# Patient Record
Sex: MALE | Employment: OTHER | URBAN - METROPOLITAN AREA
[De-identification: names, ages, dates, MRNs, and addresses within clinical notes are randomized per-mention and may not be internally consistent; named-entity substitution may affect disease eponyms.]

---

## 2022-06-17 ENCOUNTER — OFFICE VISIT (OUTPATIENT)
Dept: VASCULAR SURGERY | Age: 72
End: 2022-06-17
Payer: COMMERCIAL

## 2022-06-17 VITALS — SYSTOLIC BLOOD PRESSURE: 110 MMHG | DIASTOLIC BLOOD PRESSURE: 70 MMHG | WEIGHT: 192 LBS

## 2022-06-17 DIAGNOSIS — N18.5 RENAL FAILURE, CHRONIC, STAGE 5 (HCC): ICD-10-CM

## 2022-06-17 DIAGNOSIS — Z78.9 LACK OF INTRAVENOUS ACCESS: ICD-10-CM

## 2022-06-17 PROCEDURE — G8421 BMI NOT CALCULATED: HCPCS | Performed by: SURGERY

## 2022-06-17 PROCEDURE — 99204 OFFICE O/P NEW MOD 45 MIN: CPT | Performed by: SURGERY

## 2022-06-17 PROCEDURE — 1036F TOBACCO NON-USER: CPT | Performed by: SURGERY

## 2022-06-17 PROCEDURE — G8427 DOCREV CUR MEDS BY ELIG CLIN: HCPCS | Performed by: SURGERY

## 2022-06-17 PROCEDURE — 3017F COLORECTAL CA SCREEN DOC REV: CPT | Performed by: SURGERY

## 2022-06-17 PROCEDURE — 1123F ACP DISCUSS/DSCN MKR DOCD: CPT | Performed by: SURGERY

## 2022-06-17 RX ORDER — CALCIUM ACETATE 667 MG/1
667 CAPSULE ORAL
COMMUNITY
Start: 2022-03-28

## 2022-06-17 RX ORDER — CLOBETASOL PROPIONATE 0.5 MG/G
OINTMENT TOPICAL PRN
COMMUNITY
Start: 2022-05-02

## 2022-06-17 RX ORDER — ERGOCALCIFEROL 1.25 MG/1
50000 CAPSULE ORAL WEEKLY
COMMUNITY
Start: 2022-06-13

## 2022-06-17 RX ORDER — GABAPENTIN 100 MG/1
100 CAPSULE ORAL
COMMUNITY
Start: 2022-05-02 | End: 2022-07-25 | Stop reason: ALTCHOICE

## 2022-06-17 RX ORDER — CINACALCET 30 MG/1
30 TABLET, FILM COATED ORAL
COMMUNITY
Start: 2022-06-09

## 2022-06-17 RX ORDER — PANTOPRAZOLE SODIUM 40 MG/1
1 TABLET, DELAYED RELEASE ORAL DAILY
COMMUNITY
End: 2022-07-25 | Stop reason: ALTCHOICE

## 2022-06-17 RX ORDER — SEVELAMER CARBONATE 800 MG/1
800 TABLET, FILM COATED ORAL
COMMUNITY
Start: 2022-05-12

## 2022-06-17 RX ORDER — AMMONIUM LACTATE 12 G/100G
LOTION TOPICAL 4 TIMES DAILY PRN
COMMUNITY
Start: 2022-03-01

## 2022-06-17 RX ORDER — TAMSULOSIN HYDROCHLORIDE 0.4 MG/1
0.4 CAPSULE ORAL NIGHTLY
COMMUNITY
Start: 2022-03-28

## 2022-06-17 RX ORDER — HYDROXYZINE HYDROCHLORIDE 25 MG/1
25 TABLET, FILM COATED ORAL
COMMUNITY
Start: 2022-05-04 | End: 2022-07-25 | Stop reason: ALTCHOICE

## 2022-06-17 RX ORDER — ERGOCALCIFEROL (VITAMIN D2) 1250 MCG
1250 CAPSULE ORAL WEEKLY
COMMUNITY
Start: 2022-03-28 | End: 2022-07-25 | Stop reason: ALTCHOICE

## 2022-06-17 RX ORDER — SODIUM ZIRCONIUM CYCLOSILICATE 10 G/10G
10 POWDER, FOR SUSPENSION ORAL SEE ADMIN INSTRUCTIONS
COMMUNITY
Start: 2022-06-13

## 2022-06-17 RX ORDER — FUROSEMIDE 40 MG/1
40 TABLET ORAL DAILY
COMMUNITY
Start: 2022-03-28

## 2022-06-17 RX ORDER — AMLODIPINE BESYLATE 5 MG/1
1 TABLET ORAL DAILY
COMMUNITY
Start: 2022-03-28 | End: 2022-07-25 | Stop reason: ALTCHOICE

## 2022-06-17 ASSESSMENT — ENCOUNTER SYMPTOMS
ALLERGIC/IMMUNOLOGIC NEGATIVE: 1
GASTROINTESTINAL NEGATIVE: 1
RESPIRATORY NEGATIVE: 1

## 2022-06-17 NOTE — PATIENT INSTRUCTIONS
At this time the plan is for a left arm fistula, however what type will depend upon your vein mapping results. This is outpatient surgery. You will need a ride to and from the hospital and also a pre op history and physical from your primary care doctor within 30 days of surgery. Closer to the date of your surgery you will be called with an arrival time and a surgery time, as well as to go over medications and instructions. Once you are home from the hospital call to make a two week post-op follow up. Also after surgery, keep your arm and hand wrapped in Ace Wraps to keep swelling down and to help with healing. Surgery will be scheduled for August 3, 2022.

## 2022-06-17 NOTE — PROGRESS NOTES
Daily Progress Note   Wang Spain MD      6/17/2022    Chief Complaint   Patient presents with    New Patient     [facility] by [facility] in arm. Pt says he had mapping done in April, dialysis is accessing from tunnel catheter currently but he says the last 2 times they had some difficulty. HISTORY OF PRESENT ILLNESS:                The patient is a 73 y.o. male who presents with a referral from Dr. Manuel Samuel at [address] [facility] [facility] to discuss placement of dialysis access. Mr. Jill Jordan had a tunnel catheter placed in January for hemodialysis. He says he is not sure why he lost his kidney function, but his wife, who is with him today, says he had not been to the doctor in more than 25 years. [facility]. Jill Jordan [facility], on Tues, Thurs, Sat, and his nephrologist is Dr. Manuel Samuel. He is right handed. He had a vein mapping done at [facility], but we still don't have the report at this time. He says what stops him walking is shortness of breath. He denies leg pain, although he did have swelling in his legs before dialysis. Past Medical History:   Diagnosis Date    Chronic atrial fibrillation ([facility] [address]     ESRD (end stage renal disease) on dialysis [facility])        History reviewed. No pertinent surgical history.     Social History     Socioeconomic History    Marital status: Unknown     Spouse name: Not on file    Number of children: Not on file    Years of education: Not on file    Highest education level: Not on file   Occupational History    Not on file   Tobacco Use    Smoking status: Never Smoker    Smokeless tobacco: Never Used   Substance and Sexual Activity    Alcohol use: Never    Drug use: Not on file    Sexual activity: Not on file   Other Topics Concern    Not on file   Social History Narrative    Not on file     Social Determinants of Health     Financial Resource Strain:     Difficulty of Paying Living Expenses: Not on file   Food Insecurity:     Worried About Running Out of Food in the Last Year: Not on file    Ran Out of Food in the Last Year: Not on file   Transportation Needs:     Lack of Transportation (Medical): Not on file    Lack of Transportation (Non-Medical): Not on file   Physical Activity:     Days of Exercise per Week: Not on file    Minutes of Exercise per Session: Not on file   Stress:     Feeling of Stress : Not on file   Social Connections:     Frequency of Communication with Friends and Family: Not on file    Frequency of Social Gatherings with Friends and Family: Not on file    Attends Anglican Services: Not on file    Active Member of [address] [place] [address] [employer] or Organizations: Not on file    Attends Club or Organization Meetings: Not on file    Marital Status: Not on file   Intimate Partner Violence:     Fear of Current or Ex-Partner: Not on file    Emotionally Abused: Not on file    Physically Abused: Not on file    Sexually Abused: Not on file   Housing Stability:     Unable to Pay for Housing in the Last Year: Not on file    Number of [place] in the Last Year: Not on file    Unstable Housing in the Last Year: Not on file       No family history on file.       Current Outpatient Medications:     amLODIPine (NORVASC) 5 MG tablet, Take 1 tablet by mouth daily, Disp: , Rfl:     ammonium lactate (LAC-HYDRIN) 12 % lotion, Apply topically 4 times daily as needed, Disp: , Rfl:     calcium acetate (PHOSLO) 667 MG CAPS capsule, Take 667 mg by mouth, Disp: , Rfl:     ciclopirox (LOPROX) 0.77 % cream, Apply topically, Disp: , Rfl:     cinacalcet (SENSIPAR) 30 MG tablet, Take 30 mg by mouth, Disp: , Rfl:     clobetasol (TEMOVATE) 0.05 % ointment, Apply topically, Disp: , Rfl:     ergocalciferol (ERGOCALCIFEROL) 1.25 MG (65519 UT) capsule, Take 1,250 mcg by mouth, Disp: , Rfl:     furosemide (LASIX) 40 MG tablet, Take 40 mg by mouth, Disp: , Rfl:     gabapentin (NEURONTIN) 100 MG capsule, Take 100 mg by mouth., Disp: , Rfl:     hydrOXYzine HCl (ATARAX) 25 MG tablet, Take 25 mg by mouth, Disp: , Rfl:     sevelamer (RENVELA) 800 MG tablet, Take 800 mg by mouth, Disp: , Rfl:     tamsulosin (FLOMAX) 0.4 MG capsule, Take 0.4 mg by mouth, Disp: , Rfl:     vitamin D (ERGOCALCIFEROL) 1.25 MG (40254 UT) CAPS capsule, , Disp: , Rfl:     metoprolol tartrate (LOPRESSOR) 25 MG tablet, Take 25 mg by mouth (Patient not taking: Reported on 6/17/2022), Disp: , Rfl:     LOKELMA 10 g PACK oral suspension, , Disp: , Rfl:     pantoprazole (PROTONIX) 40 MG tablet, Take 1 tablet by mouth daily, Disp: , Rfl:     Patient has no known allergies. Vitals:    06/17/22 0935 06/17/22 0937   BP: 118/70 110/70   Weight: 192 lb (87.1 kg)        No results found for any previous visit. Review of Systems   Constitutional: Negative. HENT: Negative. Eyes: Positive for visual disturbance (wears glasses). Respiratory: Negative. Cardiovascular: Negative. Gastrointestinal: Negative. Endocrine: Negative. Genitourinary: Negative. Musculoskeletal: Negative. Skin: Negative. Allergic/Immunologic: Negative. Neurological: Negative. Hematological: Negative. Psychiatric/Behavioral: Negative. All other systems reviewed and are negative. Physical Exam  Vitals and nursing note reviewed. Constitutional:       Appearance: Normal appearance. He is well-developed. Eyes:      Conjunctiva/sclera: Conjunctivae normal.      Pupils: Pupils are equal, round, and reactive to light. Cardiovascular:      Rate and Rhythm: Normal rate and regular rhythm. Pulses:           Radial pulses are 2+ on the right side and 2+ on the left side. Dorsalis pedis pulses are 2+ on the right side and 2+ on the left side. Posterior tibial pulses are 0 on the right side and 0 on the left side. Heart sounds: Normal heart sounds.       Comments:     MEASUREMENTS 6/17/22:    RIGHT ANKLE: 22.0 cm   RIGHT CALF: 35.2 cm     LEFT ANKLE: 23.5 cm   LEFT CALF: 36.2 cm     Doppler 6/17/2022:  Rt DP: biphasic  Rt PT:  biphasic  Rt AT:     Lt DP: biphasic  Lt PT: biphasic  Lt AT:      6/17/22:   Lt radial: +2, biphasic  Lt ulnar: monophasic  Lt co-dominant with radial more dominant    Rt radial: +2, biphasic  Rt ulnar: monophasic  Rt co-dominant  Pulmonary:      Effort: Pulmonary effort is normal.      Breath sounds: Normal breath sounds. Abdominal:      General: Bowel sounds are normal.   Musculoskeletal:         General: Normal range of motion. Cervical back: Normal range of motion. Neurological:      Mental Status: He is alert and oriented to person, place, and time. Psychiatric:         Speech: Speech normal.         Behavior: Behavior normal.         Thought Content: Thought content normal.         Judgment: Judgment normal.       He has had eye surgery. He has a-fib. Currently his heart sounds are regular he has had a right tunnel catheter placed. ASSESSMENT:    Problem List Items Addressed This Visit        Medium    Renal failure, chronic, stage 5 (HCC)    Lack of intravenous access      On exam he has cephalic veins that look good course we do not know if they are complete without the report from [facility]  We discussed fistulas and grafts went through the 5 preference order and why he had a lot of questions about what is going to look like how big the vein would be etc.  PLAN:    At this time the plan is for a left arm fistula, however what type will depend upon your vein mapping results. This is outpatient surgery. You will need a ride to and from the hospital and also a pre op history and physical from your primary care doctor within 30 days of surgery. Closer to the date of your surgery you will be called with an arrival time and a surgery time, as well as to go over medications and instructions. Once you are home from the hospital call to make a two week post-op follow up.   Also after surgery, keep your arm and hand wrapped in Ace Wraps to keep swelling down and to help with healing. Surgery will be scheduled for August 3, 2022. Ronny Mccabe MA am scribing for and in the presence of Cruzito Jimenez MD on this date of 06/17/22    I Danica Medeiros MD personally performed the services described in this documentation as scribed by the Medical Assistant Henny Zendejas in my presence and it is both accurate and complete.         Electronically signed by Cruzito Jimenez MD on 6/17/2022 at 12:50 PM

## 2022-06-21 ENCOUNTER — TELEPHONE (OUTPATIENT)
Dept: SURGERY | Age: 72
End: 2022-06-21

## 2022-06-21 NOTE — TELEPHONE ENCOUNTER
I called Darlene Reece to let her know Sissy Castroine is scheduled 8/3/22 for a left arm Radial Artery to Cephalic Vein Fistula, or Possible Left Brachial Artery to Cephalic Vein Fistula, to be determined with the ultrasound at the time of surgery.

## 2022-07-06 ENCOUNTER — TELEPHONE (OUTPATIENT)
Dept: SURGERY | Age: 72
End: 2022-07-06

## 2022-07-06 NOTE — TELEPHONE ENCOUNTER
Spoke with patient's Wife, Daniel Gardner,  regarding scheduled surgery on 08- with Dr. Mason Craig. Patient is asked to arrive by 7:25 AM @ [address] after midnight. May take any Heart or Blood Pressure medication with a small sip of water to wash them down. He will need someone to drive him home following this procedure. Please bring a Photo ID & Insurance card with you, and check-in at the Surgery Desk down the right-hand hallway on the first floor. Patient will see PCP for Pre-op H & P on 7/8/2022. Surgery is scheduled to start at approx. 9:25 AM and should take approx. 120 minutes. If the hospital needs any further information, someone will give you a call. Patient/Wife expressed a verbal understanding of these instructions and had no further questions at this time. Mailed instruction letter. Call ended.

## 2022-07-25 RX ORDER — GABAPENTIN 300 MG/1
300 CAPSULE ORAL SEE ADMIN INSTRUCTIONS
COMMUNITY
Start: 2022-06-27

## 2022-07-25 RX ORDER — HYDROXYZINE HYDROCHLORIDE 25 MG/1
25 TABLET, FILM COATED ORAL NIGHTLY
COMMUNITY

## 2022-07-25 RX ORDER — GLYCERIN/MALTODEXTRIN
2 LIQUID (ML) ORAL DAILY
COMMUNITY

## 2022-07-25 NOTE — PROGRESS NOTES
C-diff Questionnaire:     * Admitted with diarrhea? [] YES    [x]  NO     *Prior history of C-Diff. In last 3 months? [] YES    [x]  NO     *Antibiotic use in the past 6-8 weeks? [x]  NO    []  YES      If yes, which: REASON_________________     *Prior hospitalization or nursing home in the last month? []  YES    [x]  NO     [facility]. .call before you fall    [address] time___0725_________        Surgery time___0925________    Do not eat or drink anything after 12:00 midnight prior to your surgery. This includes water chewing gum, mints and ice chips- the Day of Surgery. Follow your MD/Surgeons pre-procedure instructions regarding your medications   You may brush your teeth and gargle the morning of your surgery, but do not swallow the water     Please see your family doctor/pediatrician for a history and physical and/or questions concerning medications. Bring any test results/reports from your physicians office. If you are under the care of a heart doctor or specialist doctor, please be aware that you may be asked to them for clearance    You may be asked to stop blood thinners such as Coumadin, Plavix, Fragmin, Lovenox, etc., or any anti-inflammatories such as:  Aspirin, Ibuprofen, Advil, Naproxen prior to your surgery. We also ask that you stop any OTC medications such as fish oil, vitamin E, glucosamine, garlic, Multivitamins, COQ 10, etc.   MAY TAKE TYLENOL  We ask that you do not smoke 24 hours prior to surgery  We ask that you do not  drink any alcoholic beverages 24 hours prior to surgery     You must make arrangements for a responsible adult to take you home after your surgery. For your safety you will not be allowed to leave alone or drive yourself home. Your surgery will be cancelled if you do not have a ride home.      Also for your safety, it is strongly suggested that someone stay with you the first 24 hours after your surgery. A parent or legal guardian must accompany a child scheduled for surgery and plan to stay at the hospital until the child is discharged. Please do not bring other children with you. For your comfort, please wear simple loose fitting clothing to the hospital.  Please do not bring valuables. Do not wear any make-up or nail polish on your fingers or toes. For your safety, please do not wear any jewelry or body piercing's on the day of surgery. All jewelry must be removed. If you have dentures, they will be removed before going to operating room. For your convenience, we will provide you with a container. If you wear contact lenses or glasses, they will be removed, please bring a case for them. If you have a living will and a durable power of  for healthcare, please bring in a copy. As part of our patient safety program to minimize surgical site infections, we ask you to do the following:    Please notify your surgeon if you develop any illness between         now and the day of your surgery. This includes a cough, cold, fever, sore throat, nausea,         or vomiting, and diarrhea, etc.   Please notify your surgeon if you experience dizziness, shortness         of breath or blurred vision between now and the time of your surgery. Do not shave your operative site 96 hours prior to surgery. For face and neck surgery, men may use an electric razor 48 hours   prior to surgery. You may shower the night before surgery or the morning of   your surgery with an antibacterial soap. You will need to bring a photo ID and insurance card     If you use a C-pap or Bi-pap machine, please bring your machine with you to the hospital     Our goal is to provide you with excellent care, therefore, visitors will be limited to so that we may focus on providing this care for you. Please contact your surgeon office, if you have any further questions. [facility] phone number:  [phone] PAT fax number:  [phone]    Please note these are generalized instructions for all surgical cases, you may be provided with more specific instructions according to your surgery.

## 2022-08-02 ENCOUNTER — ANESTHESIA EVENT (OUTPATIENT)
Dept: OPERATING ROOM | Age: 72
End: 2022-08-02
Payer: COMMERCIAL

## 2022-08-03 ENCOUNTER — ANESTHESIA (OUTPATIENT)
Dept: OPERATING ROOM | Age: 72
End: 2022-08-03
Payer: COMMERCIAL

## 2022-08-03 ENCOUNTER — HOSPITAL ENCOUNTER (OUTPATIENT)
Age: 72
Setting detail: OUTPATIENT SURGERY
Discharge: HOME | End: 2022-08-03
Attending: SURGERY | Admitting: SURGERY
Payer: COMMERCIAL

## 2022-08-03 VITALS
DIASTOLIC BLOOD PRESSURE: 70 MMHG | SYSTOLIC BLOOD PRESSURE: 112 MMHG | BODY MASS INDEX: 26.09 KG/M2 | TEMPERATURE: 98 F | RESPIRATION RATE: 12 BRPM | OXYGEN SATURATION: 98 % | WEIGHT: 196.87 LBS | HEIGHT: 73 IN | HEART RATE: 66 BPM

## 2022-08-03 DIAGNOSIS — Z78.9 LACK OF INTRAVENOUS ACCESS: Primary | ICD-10-CM

## 2022-08-03 DIAGNOSIS — N18.5 RENAL FAILURE, CHRONIC, STAGE 5 (HCC): ICD-10-CM

## 2022-08-03 LAB — POTASSIUM, WHOLE BLOOD: 3.5 MMOL/L (ref 3.5–5.1)

## 2022-08-03 PROCEDURE — 2580000003 HC RX 258: Performed by: ANESTHESIOLOGY

## 2022-08-03 PROCEDURE — 6360000002 HC RX W HCPCS

## 2022-08-03 PROCEDURE — 2500000003 HC RX 250 WO HCPCS

## 2022-08-03 PROCEDURE — 2709999900 HC NON-CHARGEABLE SUPPLY: Performed by: SURGERY

## 2022-08-03 PROCEDURE — 7100000011 HC PHASE II RECOVERY - ADDTL 15 MIN: Performed by: SURGERY

## 2022-08-03 PROCEDURE — 3700000000 HC ANESTHESIA ATTENDED CARE: Performed by: SURGERY

## 2022-08-03 PROCEDURE — 7100000000 HC PACU RECOVERY - FIRST 15 MIN: Performed by: SURGERY

## 2022-08-03 PROCEDURE — 2720000010 HC SURG SUPPLY STERILE: Performed by: SURGERY

## 2022-08-03 PROCEDURE — 3600000015 HC SURGERY LEVEL 5 ADDTL 15MIN: Performed by: SURGERY

## 2022-08-03 PROCEDURE — 3600000005 HC SURGERY LEVEL 5 BASE: Performed by: SURGERY

## 2022-08-03 PROCEDURE — 2580000003 HC RX 258: Performed by: SURGERY

## 2022-08-03 PROCEDURE — 2500000003 HC RX 250 WO HCPCS: Performed by: SURGERY

## 2022-08-03 PROCEDURE — 84132 ASSAY OF SERUM POTASSIUM: CPT

## 2022-08-03 PROCEDURE — A4217 STERILE WATER/SALINE, 500 ML: HCPCS | Performed by: SURGERY

## 2022-08-03 PROCEDURE — 3700000001 HC ADD 15 MINUTES (ANESTHESIA): Performed by: SURGERY

## 2022-08-03 PROCEDURE — 2580000003 HC RX 258

## 2022-08-03 PROCEDURE — 36825 ARTERY-VEIN AUTOGRAFT: CPT | Performed by: SURGERY

## 2022-08-03 PROCEDURE — 7100000010 HC PHASE II RECOVERY - FIRST 15 MIN: Performed by: SURGERY

## 2022-08-03 PROCEDURE — 6360000002 HC RX W HCPCS: Performed by: SURGERY

## 2022-08-03 PROCEDURE — 7100000001 HC PACU RECOVERY - ADDTL 15 MIN: Performed by: SURGERY

## 2022-08-03 RX ORDER — SODIUM CHLORIDE 9 MG/ML
INJECTION, SOLUTION INTRAVENOUS PRN
Status: DISCONTINUED | OUTPATIENT
Start: 2022-08-03 | End: 2022-08-03 | Stop reason: HOSPADM

## 2022-08-03 RX ORDER — PHENYLEPHRINE HCL IN 0.9% NACL 1 MG/10 ML
SYRINGE (ML) INTRAVENOUS PRN
Status: DISCONTINUED | OUTPATIENT
Start: 2022-08-03 | End: 2022-08-03 | Stop reason: SDUPTHER

## 2022-08-03 RX ORDER — SODIUM CHLORIDE 9 MG/ML
INJECTION, SOLUTION INTRAVENOUS CONTINUOUS PRN
Status: DISCONTINUED | OUTPATIENT
Start: 2022-08-03 | End: 2022-08-03 | Stop reason: SDUPTHER

## 2022-08-03 RX ORDER — ONDANSETRON 2 MG/ML
4 INJECTION INTRAMUSCULAR; INTRAVENOUS
Status: DISCONTINUED | OUTPATIENT
Start: 2022-08-03 | End: 2022-08-03 | Stop reason: HOSPADM

## 2022-08-03 RX ORDER — FENTANYL CITRATE 50 UG/ML
50 INJECTION, SOLUTION INTRAMUSCULAR; INTRAVENOUS EVERY 5 MIN PRN
Status: DISCONTINUED | OUTPATIENT
Start: 2022-08-03 | End: 2022-08-03 | Stop reason: HOSPADM

## 2022-08-03 RX ORDER — FENTANYL CITRATE 50 UG/ML
25 INJECTION, SOLUTION INTRAMUSCULAR; INTRAVENOUS EVERY 5 MIN PRN
Status: DISCONTINUED | OUTPATIENT
Start: 2022-08-03 | End: 2022-08-03 | Stop reason: HOSPADM

## 2022-08-03 RX ORDER — OXYCODONE HYDROCHLORIDE AND ACETAMINOPHEN 5; 325 MG/1; MG/1
1 TABLET ORAL EVERY 6 HOURS PRN
Qty: 28 TABLET | Refills: 0 | Status: SHIPPED | OUTPATIENT
Start: 2022-08-03 | End: 2022-08-10

## 2022-08-03 RX ORDER — SODIUM CHLORIDE 0.9 % (FLUSH) 0.9 %
5-40 SYRINGE (ML) INJECTION PRN
Status: DISCONTINUED | OUTPATIENT
Start: 2022-08-03 | End: 2022-08-03 | Stop reason: HOSPADM

## 2022-08-03 RX ORDER — SODIUM CHLORIDE 0.9 % (FLUSH) 0.9 %
5-40 SYRINGE (ML) INJECTION EVERY 12 HOURS SCHEDULED
Status: DISCONTINUED | OUTPATIENT
Start: 2022-08-03 | End: 2022-08-03 | Stop reason: HOSPADM

## 2022-08-03 RX ORDER — OXYCODONE HYDROCHLORIDE AND ACETAMINOPHEN 5; 325 MG/1; MG/1
1 TABLET ORAL
Status: DISCONTINUED | OUTPATIENT
Start: 2022-08-03 | End: 2022-08-03 | Stop reason: HOSPADM

## 2022-08-03 RX ORDER — SUCCINYLCHOLINE CHLORIDE 20 MG/ML
INJECTION INTRAMUSCULAR; INTRAVENOUS PRN
Status: DISCONTINUED | OUTPATIENT
Start: 2022-08-03 | End: 2022-08-03 | Stop reason: SDUPTHER

## 2022-08-03 RX ORDER — HEPARIN SODIUM 10000 [USP'U]/ML
INJECTION, SOLUTION INTRAVENOUS; SUBCUTANEOUS PRN
Status: DISCONTINUED | OUTPATIENT
Start: 2022-08-03 | End: 2022-08-03 | Stop reason: SDUPTHER

## 2022-08-03 RX ORDER — PROCHLORPERAZINE EDISYLATE 5 MG/ML
5 INJECTION INTRAMUSCULAR; INTRAVENOUS
Status: DISCONTINUED | OUTPATIENT
Start: 2022-08-03 | End: 2022-08-03 | Stop reason: HOSPADM

## 2022-08-03 RX ORDER — PROPOFOL 10 MG/ML
INJECTION, EMULSION INTRAVENOUS PRN
Status: DISCONTINUED | OUTPATIENT
Start: 2022-08-03 | End: 2022-08-03 | Stop reason: SDUPTHER

## 2022-08-03 RX ORDER — LIDOCAINE HYDROCHLORIDE 20 MG/ML
INJECTION, SOLUTION EPIDURAL; INFILTRATION; INTRACAUDAL; PERINEURAL PRN
Status: DISCONTINUED | OUTPATIENT
Start: 2022-08-03 | End: 2022-08-03 | Stop reason: SDUPTHER

## 2022-08-03 RX ORDER — ONDANSETRON 2 MG/ML
INJECTION INTRAMUSCULAR; INTRAVENOUS PRN
Status: DISCONTINUED | OUTPATIENT
Start: 2022-08-03 | End: 2022-08-03 | Stop reason: SDUPTHER

## 2022-08-03 RX ORDER — FENTANYL CITRATE 50 UG/ML
INJECTION, SOLUTION INTRAMUSCULAR; INTRAVENOUS PRN
Status: DISCONTINUED | OUTPATIENT
Start: 2022-08-03 | End: 2022-08-03 | Stop reason: SDUPTHER

## 2022-08-03 RX ORDER — BUPIVACAINE HYDROCHLORIDE 5 MG/ML
INJECTION, SOLUTION EPIDURAL; INTRACAUDAL
Status: COMPLETED | OUTPATIENT
Start: 2022-08-03 | End: 2022-08-03

## 2022-08-03 RX ORDER — EPHEDRINE SULFATE 50 MG/ML
INJECTION INTRAVENOUS PRN
Status: DISCONTINUED | OUTPATIENT
Start: 2022-08-03 | End: 2022-08-03 | Stop reason: SDUPTHER

## 2022-08-03 RX ORDER — MAGNESIUM HYDROXIDE 1200 MG/15ML
LIQUID ORAL CONTINUOUS PRN
Status: DISCONTINUED | OUTPATIENT
Start: 2022-08-03 | End: 2022-08-03 | Stop reason: HOSPADM

## 2022-08-03 RX ORDER — SODIUM CHLORIDE 9 MG/ML
INJECTION, SOLUTION INTRAVENOUS CONTINUOUS
Status: DISCONTINUED | OUTPATIENT
Start: 2022-08-03 | End: 2022-08-03 | Stop reason: HOSPADM

## 2022-08-03 RX ADMIN — SUCCINYLCHOLINE CHLORIDE 140 MG: 20 INJECTION, SOLUTION INTRAMUSCULAR; INTRAVENOUS at 08:40

## 2022-08-03 RX ADMIN — ONDANSETRON 4 MG: 2 INJECTION INTRAMUSCULAR; INTRAVENOUS at 10:04

## 2022-08-03 RX ADMIN — Medication 100 MCG: at 09:12

## 2022-08-03 RX ADMIN — SODIUM CHLORIDE: 9 INJECTION, SOLUTION INTRAVENOUS at 08:33

## 2022-08-03 RX ADMIN — EPHEDRINE SULFATE 10 MG: 50 INJECTION INTRAVENOUS at 09:35

## 2022-08-03 RX ADMIN — LIDOCAINE HYDROCHLORIDE 100 MG: 20 INJECTION, SOLUTION EPIDURAL; INFILTRATION; INTRACAUDAL; PERINEURAL at 08:38

## 2022-08-03 RX ADMIN — CEFAZOLIN 2000 MG: 2 INJECTION, POWDER, FOR SOLUTION INTRAMUSCULAR; INTRAVENOUS at 08:40

## 2022-08-03 RX ADMIN — EPHEDRINE SULFATE 10 MG: 50 INJECTION INTRAVENOUS at 10:00

## 2022-08-03 RX ADMIN — FENTANYL CITRATE 25 MCG: 50 INJECTION INTRAMUSCULAR; INTRAVENOUS at 08:33

## 2022-08-03 RX ADMIN — FENTANYL CITRATE 75 MCG: 50 INJECTION INTRAMUSCULAR; INTRAVENOUS at 08:37

## 2022-08-03 RX ADMIN — EPHEDRINE SULFATE 10 MG: 50 INJECTION INTRAVENOUS at 08:54

## 2022-08-03 RX ADMIN — Medication 100 MCG: at 09:26

## 2022-08-03 RX ADMIN — PROPOFOL 150 MG: 10 INJECTION, EMULSION INTRAVENOUS at 08:40

## 2022-08-03 RX ADMIN — EPHEDRINE SULFATE 10 MG: 50 INJECTION INTRAVENOUS at 09:40

## 2022-08-03 RX ADMIN — EPHEDRINE SULFATE 10 MG: 50 INJECTION INTRAVENOUS at 09:55

## 2022-08-03 RX ADMIN — HEPARIN SODIUM 7000 UNITS: 10000 INJECTION INTRAVENOUS; SUBCUTANEOUS at 09:29

## 2022-08-03 RX ADMIN — SODIUM CHLORIDE: 9 INJECTION, SOLUTION INTRAVENOUS at 07:37

## 2022-08-03 ASSESSMENT — LIFESTYLE VARIABLES: SMOKING_STATUS: 0

## 2022-08-03 ASSESSMENT — ENCOUNTER SYMPTOMS: DYSPNEA ACTIVITY LEVEL: AFTER AMBULATING 1 FLIGHT OF STAIRS

## 2022-08-03 ASSESSMENT — PAIN SCALES - GENERAL: PAINLEVEL_OUTOF10: 0

## 2022-08-03 NOTE — PROGRESS NOTES
Patient admitted to PACU # 13 from OR at 1020 post [address] per Dr. Ti Faust. Attached to PACU monitoring system and report received from anesthesia provider. Patient was reported to be hemodynamically stable during procedure. Patient drowsy on admission and denied pain.

## 2022-08-03 NOTE — DISCHARGE INSTRUCTIONS
-  Home Care    Keep the incision area clean and dry. You have absorbable stitches with \"super glue\" on top. You May need to support your arm with Ace wraps  if it swells alot    Do not take a bath    Diet    Follow your doctor's instructions on eating a clear liquid diet today. Once your body can handle this, you can resume your regular diet. Physical Activity    Ask your doctor when you will be able to return to work and drive. Avoid sexual activity for 1-2 weeks after surgery. Avoid strenuous activity and heavy lifting for 6 weeks after surgery. Medications    If you had to stop medicines before the procedure, ask your doctor when you can start again. Medicines often stopped include:   Anti-inflammatory drugs (eg, aspirin )   Blood thinners like clopidogrel (Plavix) or warfarin (Coumadin)   Your doctor will prescribe pain medication for you. If you are taking medications, follow these general guidelines:   Take your medication as directed. Do not change the amount or the schedule. Do not stop taking them without talking to your doctor. Do not share them. Know what the results and side effects. Report them to your doctor. Some drugs can be dangerous when mixed. Talk to a doctor or pharmacist if you are taking more than one drug. This includes over-the-counter medication and herb or dietary supplements. Plan ahead for refills so you don't run out. Follow-up   Schedule a follow-up appointment with Aram Roland for about two weeks after the day of your surgery. [phone]    Call Your Doctor If Any of the Following Occurs   Monitor your recovery once you leave the hospital. As soon as you have a problem, alert your doctor.  If any of the following occur, call your doctor:   Signs of infection, including fever and chills   Redness, swelling, increasing pain, excessive bleeding, or any discharge from the incision site   Nausea and/or vomiting that you can't control with the medications

## 2022-08-03 NOTE — PROGRESS NOTES
PACU Transfer to hospitals    Vitals:    08/03/22 1048   BP: 119/67   Pulse: 66   Resp: 16   Temp: 97 °F (36.1 °C)   SpO2: 96%         Intake/Output Summary (Last 24 hours) at 8/3/2022 1049  Last data filed at 8/3/2022 1005  Gross per 24 hour   Intake 550 ml   Output --   Net 550 ml       Pain assessment:  none  Pain Level: 0    Patient transferred to care of hospitals RN.    8/3/2022 10:49 AM

## 2022-08-03 NOTE — ANESTHESIA POSTPROCEDURE EVALUATION
Department of Anesthesiology  Postprocedure Note    Patient: Gretchen Hamilton  MRN: [id number]  YOB: 1950  Date of evaluation: 8/3/2022      Procedure Summary     Date: 08/03/22 Room / Location: [address]    Anesthesia Start: 3789 Anesthesia Stop: 3574    Procedure: LEFT RADIAL ARTERY TO CEPHALIC VEIN FISTULA. (Left: Arm Lower) Diagnosis:       End stage renal disease (HCC)      (END STAGE RENAL DISEASE)    Surgeons: Eliza Huang MD Responsible Provider: London Gaming MD    Anesthesia Type: general ASA Status: 3          Anesthesia Type: MAC    Bruno Phase I: Bruno Score: 10    Bruno Phase II: Bruno Score: 10      Anesthesia Post Evaluation    Patient location during evaluation: PACU  Patient participation: complete - patient participated  Level of consciousness: awake and alert  Airway patency: patent  Nausea & Vomiting: no nausea and no vomiting  Complications: no  Cardiovascular status: hemodynamically stable and blood pressure returned to baseline  Respiratory status: spontaneous ventilation and nonlabored ventilation  Hydration status: stable  Comments: Mr. Grecia Feldman was seen resting comfortably following procedure. Anticipate transfer to Phase II for planned discharge home with .

## 2022-08-03 NOTE — H&P
Agree with H&P from Outpt notes, no changes noted . I have presented reasonable alternatives to the patient's proposed care, treatment, and services. The discussion I have done encompassed risks, benefits, and side effects related to the alternatives and the risks related to not receiving the proposed care, treatment, and services. All questions answered. Patient wishes to proceed.   Lt arm marked for Rad cEPHALIC vS BRACHIAL CEPHALIC FISTULA

## 2022-08-03 NOTE — ANESTHESIA PRE PROCEDURE
Department of Anesthesiology  Preprocedure Note       Name:  Linh Mcfarland   Age:  81 y.o.  :  1950                                          MRN:  [id number]         Date:  8/3/2022      Surgeon: Kathie Bonilla):  Merna Falcon MD    Procedure: Procedure(s):  LEFT RADIAL ARTERY TO CEPHALIC VEIN FISTULA, OR POSSIBLE LEFT BRACHIAL ARTERY TO CEPHALIC VEIN FISTULA    Medications prior to admission:   Prior to Admission medications    Medication Sig Start Date End Date Taking? Authorizing Provider   hydrOXYzine HCl (ATARAX) 25 MG tablet Take 25 mg by mouth at bedtime   Yes Historical Provider, MD   Amino Acids (LIQUACEL) LIQD Take 2 oz by mouth daily   Yes Historical Provider, MD   metoprolol tartrate (LOPRESSOR) 12.5 mg TABS Take 12.5 mg by mouth in the morning and 12.5 mg before bedtime. Yes Historical Provider, MD   gabapentin (NEURONTIN) 300 MG capsule Take 300 mg by mouth See Admin Instructions. ON Tuesday,Thursday, AFTER DIALYSIS 22   Historical Provider, MD   ammonium lactate (LAC-HYDRIN) 12 % lotion Apply topically 4 times daily as needed 3/1/22   Historical Provider, MD   calcium acetate (PHOSLO) 667 MG CAPS capsule Take 667 mg by mouth in the morning and 667 mg at noon and 667 mg in the evening. Take with meals. 3 CAPSULES WITH EACH MEAL.  3/28/22   Historical Provider, MD   ciclopirox (LOPROX) 0.77 % cream Apply topically as needed 3/28/22   Historical Provider, MD   cinacalcet (SENSIPAR) 30 MG tablet Take 30 mg by mouth Daily with supper 22   Historical Provider, MD   clobetasol (TEMOVATE) 0.05 % ointment Apply topically as needed 22   Historical Provider, MD   vitamin D (ERGOCALCIFEROL) 1.25 MG (50836 UT) CAPS capsule Take 50,000 Units by mouth once a week [facility] 22   Historical Provider, MD   furosemide (LASIX) 40 MG tablet Take 40 mg by mouth in the morning. 3/28/22   Historical Provider, MD   sevelamer (RENVELA) 800 MG tablet Take 800 mg by mouth in the morning and 800 08/03/22 0713 08/03/22 0715   BP:   139/80   Pulse:   68   Resp:   18   Temp:   97 °F (36.1 °C)   TempSrc:   Temporal   SpO2:   98%   Weight: 195 lb (88.5 kg) 196 lb 13.9 oz (89.3 kg)    Height: 6' 1\" (1.854 m) 6' 1\" (1.854 m)                                               BP Readings from Last 3 Encounters:   08/03/22 139/80   06/17/22 110/70       NPO Status: Time of last liquid consumption: 2000                        Time of last solid consumption: 1800                        Date of last liquid consumption: 08/02/22                        Date of last solid food consumption: 08/02/22    BMI:   Wt Readings from Last 3 Encounters:   08/03/22 196 lb 13.9 oz (89.3 kg)   06/17/22 192 lb (87.1 kg)     Body mass index is 25.97 kg/m². CBC: No results found for: WBC, RBC, HGB, HCT, MCV, RDW, PLT    CMP: No results found for: NA, K, CL, CO2, BUN, CREATININE, GFRAA, AGRATIO, LABGLOM, GLUCOSE, GLU, PROT, CALCIUM, BILITOT, ALKPHOS, AST, ALT    POC Tests: No results for input(s): POCGLU, POCNA, POCK, POCCL, POCBUN, POCHEMO, POCHCT in the last 72 hours. Coags: No results found for: PROTIME, INR, APTT    HCG (If Applicable): No results found for: PREGTESTUR, PREGSERUM, HCG, HCGQUANT     ABGs: No results found for: PHART, PO2ART, KXE5DQW, DSZ6IQV, BEART, T5CBPDVO     Type & Screen (If Applicable):  No results found for: LABABO, LABRH    Drug/Infectious Status (If Applicable):  No results found for: HIV, HEPCAB    COVID-19 Screening (If Applicable): No results found for: COVID19        Anesthesia Evaluation     history of anesthetic complications (remote with eye surgery as child): PONV. Airway: Mallampati: III  TM distance: >3 FB   Neck ROM: full  Mouth opening: > = 3 FB   Dental:    (+) poor dentition  Comment: Several missing teeth.  Patient denies loose teeth    Pulmonary:       (-) COPD, asthma, rhonchi, wheezes, rales and not a current smoker                           Cardiovascular:  Exercise tolerance: poor (<4 METS),   (+) CAD:, dysrhythmias: atrial fibrillation, CHF (RWMA; HRpEF):, COLEMAN: after ambulating 1 flight of stairs, hyperlipidemia    (-) hypertension and weak pulses      Rhythm: regular  Rate: normal                 ROS comment: Echocardiogram (2022):  Conclusions:  - Left ventricle: The cavity size is normal. Wall thickness was increased in a pattern of mild LVH. Systolic function was normal. The estimated ejection fraction was in the range of 60% to 65%. Moderate hypokinesis of the basal to mid inferior myocardium. Left ventricular diastolic function parameters were normal.    Right ventricle: Systolic function was normal. TAPSE: 1.7cm. Tricuspid annular systolic velocity: 22UK/E. Mitral valve: Mildly calcified annulus. Pulmonary arteries: Systolic pressure could not be accurately estimated. Inferior vena cava: Not well visualized. Neuro/Psych:      (-) seizures, TIA and CVA           GI/Hepatic/Renal:   (+) renal disease (HD T-R-S): CRI and ESRD,      (-) liver disease and no morbid obesityGERD: denies. Endo/Other:    (+) blood dyscrasia (last H&H 10.6/31. 5): anemia:., .    (-) diabetes mellitus (HgbA1c: 5.5%)               Abdominal:         (-) obese Abdomen: soft. Vascular: Other Findings: Tunneled dialysis catheter in situ, right chest.           Anesthesia Plan      general     ASA 3     (Potassium ordered, results pending. NPO appropriate. Mr. Micaela Mcgee denies active nausea / reflux.)  Induction: intravenous. MIPS: Postoperative opioids intended and Prophylactic antiemetics administered. Anesthetic plan and risks discussed with patient (spouse at bedside). Use of blood products discussed with patient whom consented to blood products. Plan discussed with CRNA. This pre-anesthesia assessment may be used as a history and physical.    DOS STAFF ADDENDUM:    Pt seen and examined, chart reviewed (including anesthesia, drug and allergy history).   No interval changes to history and physical examination. Anesthetic plan, risks, benefits, alternatives, and personnel involved discussed with patient. Patient verbalized an understanding and agrees to proceed.       Steph Rose MD  August 3, 2022  8:09 AM

## 2022-08-04 NOTE — OP NOTE
the fascia and got control of the artery, somewhat small and  calcified, got double vessel loops on either end. I gave 7000 of  heparin, waited three minutes, divided the vein, flushed it with  heparin, marked it so it could not spin and then brought it over to the  artery, clamped the artery and opened the artery with 11 blade. Had  some backbleeding from branches, added some clips along the back wall  and this controlled it. We then splayed open the vein, sewed it with  6-0 Prolene, heel first, then toe parachuting, then sides, then  backflushed and released all three clamps and got a pulse in the graft,  not quite a thrill. Hopefully, this will develop as he warms up and his  pressure goes up. Any case, we put some Fibrillar in but there was very  little bleeding and decided to close with 3-0 and 4-0 Vicryl plus Skin  Affix glue. The patient was sent to recovery room in stable condition. Overall impression, artery on the small side and calcified. Vein  adequate but could always be bigger.         Keyur Corado MD    D: 08/03/2022 12:42:16       T: 08/03/2022 13:33:41     [id number]  Job#: [id number]     Doc#: [id number]    CC:  Dr. Mt Baker MD

## 2022-08-19 ENCOUNTER — OFFICE VISIT (OUTPATIENT)
Dept: VASCULAR SURGERY | Age: 72
End: 2022-08-19

## 2022-08-19 VITALS — DIASTOLIC BLOOD PRESSURE: 74 MMHG | BODY MASS INDEX: 25.86 KG/M2 | WEIGHT: 196 LBS | SYSTOLIC BLOOD PRESSURE: 130 MMHG

## 2022-08-19 DIAGNOSIS — N18.5 RENAL FAILURE, CHRONIC, STAGE 5 (HCC): Primary | ICD-10-CM

## 2022-08-19 DIAGNOSIS — R20.2 NUMBNESS AND TINGLING IN LEFT HAND: ICD-10-CM

## 2022-08-19 DIAGNOSIS — R20.0 NUMBNESS AND TINGLING IN LEFT HAND: ICD-10-CM

## 2022-08-19 DIAGNOSIS — Z78.9 LACK OF INTRAVENOUS ACCESS: ICD-10-CM

## 2022-08-19 PROCEDURE — 99024 POSTOP FOLLOW-UP VISIT: CPT | Performed by: SURGERY

## 2022-08-19 ASSESSMENT — ENCOUNTER SYMPTOMS
GASTROINTESTINAL NEGATIVE: 1
ALLERGIC/IMMUNOLOGIC NEGATIVE: 1
RESPIRATORY NEGATIVE: 1

## 2022-08-19 NOTE — PROGRESS NOTES
Daily Progress Note   Denys Montes De Oca MD      8/19/2022    Chief Complaint   Patient presents with    Post-Op Check     S/p Left radial artery to cephalic vein fistula, 8/3. Pt states he has no feeling in his hand and that is where is pain lies, no pain from incision area, denies any swelling. HISTORY OF PRESENT ILLNESS:                The patient is a 81 y.o. male who presents with a post op visir for left radial artery to cephalic vein fistula done 8/3/2022. Mr. Debi Rodriguez has a good thrill in the fistula, however he says his hand is cold and numb. He says the only time he has pain is at night when he lies down with his hand straight out He says he is having trouble right now buttoning his shirts because of the left hand. Past Medical History:   Diagnosis Date    Chronic atrial fibrillation (HCC)     ESRD (end stage renal disease) on dialysis [facility])     Hemodialysis patient [facility])     Status post placement of implantable loop recorder        Past Surgical History:   Procedure Laterality Date    COLONOSCOPY      DIALYSIS FISTULA CREATION Left 8/3/2022    LEFT RADIAL ARTERY TO CEPHALIC VEIN FISTULA.  performed by Max Campbell MD at [facility]     FOR LAZY EYE    EYE SURGERY      X2-ON BLOOD VESELS IN BACK OF EYE    INSERTABLE CARDIAC MONITOR      LOOP RECORDER       Social History     Socioeconomic History    Marital status: Unknown     Spouse name: Not on file    Number of children: Not on file    Years of education: Not on file    Highest education level: Not on file   Occupational History    Not on file   Tobacco Use    Smoking status: Never    Smokeless tobacco: Never   Vaping Use    Vaping Use: Never used   Substance and Sexual Activity    Alcohol use: Never    Drug use: Never    Sexual activity: Not on file   Other Topics Concern    Not on file   Social History Narrative    Not on file     Social Determinants of Health     Financial Resource Strain: Not on file   Food Insecurity: Not on file   Transportation Needs: Not on file   Physical Activity: Not on file   Stress: Not on file   Social Connections: Not on file   Intimate Partner Violence: Not on file   Housing Stability: Not on file       Family History   Problem Relation Age of Onset    Dystonia Sister          Current Outpatient Medications:     gabapentin (NEURONTIN) 300 MG capsule, Take 300 mg by mouth See Admin Instructions. ON Tuesday,Thursday,SATURDAYS AFTER DIALYSIS, Disp: , Rfl:     hydrOXYzine HCl (ATARAX) 25 MG tablet, Take 25 mg by mouth at bedtime, Disp: , Rfl:     Amino Acids (LIQUACEL) LIQD, Take 2 oz by mouth daily, Disp: , Rfl:     ammonium lactate (LAC-HYDRIN) 12 % lotion, Apply topically 4 times daily as needed, Disp: , Rfl:     calcium acetate (PHOSLO) 667 MG CAPS capsule, Take 667 mg by mouth in the morning and 667 mg at noon and 667 mg in the evening. Take with meals. 3 CAPSULES WITH EACH MEAL., Disp: , Rfl:     ciclopirox (LOPROX) 0.77 % cream, Apply topically as needed, Disp: , Rfl:     cinacalcet (SENSIPAR) 30 MG tablet, Take 30 mg by mouth Daily with supper, Disp: , Rfl:     clobetasol (TEMOVATE) 0.05 % ointment, Apply topically as needed, Disp: , Rfl:     vitamin D (ERGOCALCIFEROL) 1.25 MG (61634 UT) CAPS capsule, Take 50,000 Units by mouth once a week WEDNESDAY, Disp: , Rfl:     furosemide (LASIX) 40 MG tablet, Take 40 mg by mouth in the morning., Disp: , Rfl:     sevelamer (RENVELA) 800 MG tablet, Take 800 mg by mouth in the morning and 800 mg at noon and 800 mg in the evening. Take with meals. , Disp: , Rfl:     tamsulosin (FLOMAX) 0.4 MG capsule, Take 0.4 mg by mouth at bedtime, Disp: , Rfl:     LOKELMA 10 g PACK oral suspension, Take 10 g by mouth See Admin Instructions EVERY Monday,Wednesday,Friday,Sunday-NON DIALYSIS DAYS, Disp: , Rfl:     metoprolol tartrate (LOPRESSOR) 12.5 mg TABS, Take 12.5 mg by mouth in the morning and 12.5 mg before bedtime.  (Patient not taking: Reported on 8/19/2022), Disp: , Rfl:     Patient has no known allergies. Vitals:    08/19/22 1002   BP: 130/74   Weight: 196 lb (88.9 kg)       Admission on 08/03/2022, Discharged on 08/03/2022   Component Date Value Ref Range Status    Potassium, Whole Blood 08/03/2022 3.5  3.5 - 5.1 mmol/L Final       Review of Systems   Constitutional: Negative. HENT: Negative. Eyes:  Positive for visual disturbance (wears glasses). Respiratory: Negative. Cardiovascular: Negative. Gastrointestinal: Negative. Endocrine: Negative. Genitourinary: Negative. Musculoskeletal: Negative. Skin: Negative. Allergic/Immunologic: Negative. Neurological: Negative. Hematological: Negative. Psychiatric/Behavioral: Negative. All other systems reviewed and are negative. Physical Exam  Vitals and nursing note reviewed. Constitutional:       Appearance: Normal appearance. He is well-developed. Eyes:      Conjunctiva/sclera: Conjunctivae normal.      Pupils: Pupils are equal, round, and reactive to light. Cardiovascular:      Rate and Rhythm: Normal rate and regular rhythm. Pulses:           Radial pulses are 2+ on the right side and 2+ on the left side. Dorsalis pedis pulses are 2+ on the right side and 2+ on the left side. Posterior tibial pulses are 0 on the right side and 0 on the left side. Heart sounds: Normal heart sounds. Arteriovenous access: Left arteriovenous access is present.      Comments:   8/19/2022  Left radial: monophasic  Left ulnar: biphasic  Left mid-palmar: monophasic      MEASUREMENTS 6/17/22:    RIGHT ANKLE: 22.0 cm   RIGHT CALF: 35.2 cm     LEFT ANKLE: 23.5 cm   LEFT CALF: 36.2 cm     Doppler 6/17/2022:  Rt DP: biphasic  Rt PT:  biphasic  Rt AT:     Lt DP: biphasic  Lt PT: biphasic  Lt AT:      6/17/22:   Lt radial: +2, biphasic  Lt ulnar: monophasic  Lt co-dominant with radial more dominant    Rt radial: +2, biphasic  Rt ulnar: monophasic  Rt co-dominant  Pulmonary: Effort: Pulmonary effort is normal.      Breath sounds: Normal breath sounds. Abdominal:      General: Bowel sounds are normal.   Musculoskeletal:         General: Normal range of motion. Cervical back: Normal range of motion. Neurological:      Mental Status: He is alert and oriented to person, place, and time. Psychiatric:         Speech: Speech normal.         Behavior: Behavior normal.         Thought Content: Thought content normal.         Judgment: Judgment normal.         ASSESSMENT:    Problem List Items Addressed This Visit          Medium    Renal failure, chronic, stage 5 (HCC) - Primary    Numbness and tingling in left hand    Lack of intravenous access   Going in without the radial on the left was better than the ulnar however the Doppler signal is actually pretty good on the ulnar today the radial is also present mid palmar arch is there and monophasic. Interesting that his numbness is in his thumb first and second finger very similar to a carpal tunnel. I wonder if he got some swelling in his carpal tunnel area postop and now has carpal tunnel symptoms. The fingertips are pink not is warm on the left as it is on the right. He has no pain but is having trouble like buttoning his shirt. He has no loss of function of the hand as far as strength and mobility. Think we give it time and it is possible we may need an EMG down the road    PLAN:    Return in six weeks for a post op left arm fistula scan and office visit. Otilio Obrien MA am scribing for and in the presence of Radhika Ahmadi MD on this date of 08/19/22    I Mt Carmona MD personally performed the services described in this documentation as scribed by the Medical Assistant Fadi Zendejas in my presence and it is both accurate and complete.       Electronically signed by Radhika Ahmadi MD on 8/19/2022 at 10:28 AM

## 2022-09-12 ENCOUNTER — TELEPHONE (OUTPATIENT)
Dept: SURGERY | Age: 72
End: 2022-09-12

## 2022-09-12 NOTE — TELEPHONE ENCOUNTER
Mr. Debi Rodriguez has an appointment to see Dr. Kia Elizabeth on Friday at Dr. Misbah Mosqueda request.

## 2022-09-12 NOTE — TELEPHONE ENCOUNTER
PT is have problems with is hand that he had surgery on. He is have numbness and is unable to use the hand. PT would like to have a return call today.

## 2022-09-16 ENCOUNTER — OFFICE VISIT (OUTPATIENT)
Dept: VASCULAR SURGERY | Age: 72
End: 2022-09-16

## 2022-09-16 VITALS — BODY MASS INDEX: 25.86 KG/M2 | DIASTOLIC BLOOD PRESSURE: 70 MMHG | SYSTOLIC BLOOD PRESSURE: 120 MMHG | WEIGHT: 196 LBS

## 2022-09-16 DIAGNOSIS — R20.0 NUMBNESS AND TINGLING IN LEFT HAND: Primary | ICD-10-CM

## 2022-09-16 DIAGNOSIS — R20.2 NUMBNESS AND TINGLING IN LEFT HAND: Primary | ICD-10-CM

## 2022-09-16 DIAGNOSIS — T82.898A STEAL SYNDROME AS COMPLICATION OF DIALYSIS ACCESS, INITIAL ENCOUNTER (HCC): ICD-10-CM

## 2022-09-16 DIAGNOSIS — N18.5 RENAL FAILURE, CHRONIC, STAGE 5 (HCC): ICD-10-CM

## 2022-09-16 PROCEDURE — 99024 POSTOP FOLLOW-UP VISIT: CPT | Performed by: SURGERY

## 2022-09-16 ASSESSMENT — ENCOUNTER SYMPTOMS
ALLERGIC/IMMUNOLOGIC NEGATIVE: 1
GASTROINTESTINAL NEGATIVE: 1
RESPIRATORY NEGATIVE: 1

## 2022-09-16 NOTE — PROGRESS NOTES
Daily Progress Note   Luanne Hooper MD      9/16/2022    Chief Complaint   Patient presents with    Post-Op Check     Pt states he has hand pain, his pointer finger is starting to curl up, numbness in the first 3 fingers, at dialysis he has throbbing pain in that hand. At night he has trouble sleeping from the pain         HISTORY OF PRESENT ILLNESS:                The patient is a 81 y.o. male who presents with a post op visit for hand pain after a left arm radial artery to cephalic vein fistula done 8/3/22. Mr. Mignon Quick says his hand is extremely painful at night when he is in bed, at dialysis about 30 minutes in, and if his arm/hand are lying flat. He says the pain lessens if he lowers his hand so it is hanging down. The pain wakes him at night, if he even gets to sleep. Hanging the hand, or getting out of bed helps. If his hand is raised it becomes paler. Mr. Mignon Quick is going to [facility] in [facility], T, Th, Sat, and Dr. Chad Almonte is his nephrologist.        Past Medical History:   Diagnosis Date    Chronic atrial fibrillation ([address]     ESRD (end stage renal disease) on dialysis [facility])     Hemodialysis patient [facility])     Status post placement of implantable loop recorder        Past Surgical History:   Procedure Laterality Date    COLONOSCOPY      DIALYSIS FISTULA CREATION Left 8/3/2022    LEFT RADIAL ARTERY TO CEPHALIC VEIN FISTULA.  performed by Caitlin Walden MD at [facility]     FOR LAZY EYE    EYE SURGERY      X2-ON BLOOD VESELS IN BACK OF EYE    INSERTABLE CARDIAC MONITOR      LOOP RECORDER       Social History     Socioeconomic History    Marital status: Unknown     Spouse name: Not on file    Number of children: Not on file    Years of education: Not on file    Highest education level: Not on file   Occupational History    Not on file   Tobacco Use    Smoking status: Never    Smokeless tobacco: Never   Vaping Use    Vaping Use: Never used   Substance and Sexual Activity Alcohol use: Never    Drug use: Never    Sexual activity: Not on file   Other Topics Concern    Not on file   Social History Narrative    Not on file     Social Determinants of Health     Financial Resource Strain: Not on file   Food Insecurity: Not on file   Transportation Needs: Not on file   Physical Activity: Not on file   Stress: Not on file   Social Connections: Not on file   Intimate Partner Violence: Not on file   Housing Stability: Not on file       Family History   Problem Relation Age of Onset    Dystonia Sister          Current Outpatient Medications:     gabapentin (NEURONTIN) 300 MG capsule, Take 300 mg by mouth See Admin Instructions. ON Tuesday,Thursday,SATURDAYS AFTER DIALYSIS, Disp: , Rfl:     hydrOXYzine HCl (ATARAX) 25 MG tablet, Take 25 mg by mouth at bedtime, Disp: , Rfl:     Amino Acids (LIQUACEL) LIQD, Take 2 oz by mouth daily, Disp: , Rfl:     metoprolol tartrate (LOPRESSOR) 12.5 mg TABS, Take 12.5 mg by mouth 2 times daily, Disp: , Rfl:     ammonium lactate (LAC-HYDRIN) 12 % lotion, Apply topically 4 times daily as needed, Disp: , Rfl:     calcium acetate (PHOSLO) 667 MG CAPS capsule, Take 667 mg by mouth in the morning and 667 mg at noon and 667 mg in the evening. Take with meals. 3 CAPSULES WITH EACH MEAL., Disp: , Rfl:     ciclopirox (LOPROX) 0.77 % cream, Apply topically as needed, Disp: , Rfl:     cinacalcet (SENSIPAR) 30 MG tablet, Take 30 mg by mouth Daily with supper, Disp: , Rfl:     clobetasol (TEMOVATE) 0.05 % ointment, Apply topically as needed, Disp: , Rfl:     vitamin D (ERGOCALCIFEROL) 1.25 MG (29970 UT) CAPS capsule, Take 50,000 Units by mouth once a week WEDNESDAY, Disp: , Rfl:     furosemide (LASIX) 40 MG tablet, Take 40 mg by mouth in the morning., Disp: , Rfl:     sevelamer (RENVELA) 800 MG tablet, Take 800 mg by mouth in the morning and 800 mg at noon and 800 mg in the evening. Take with meals. , Disp: , Rfl:     tamsulosin (FLOMAX) 0.4 MG capsule, Take 0.4 mg by mouth at bedtime, Disp: , Rfl:     LOKELMA 10 g PACK oral suspension, Take 10 g by mouth See Admin Instructions EVERY Monday,Wednesday,Friday,Sunday-NON DIALYSIS DAYS, Disp: , Rfl:     Patient has no known allergies. Vitals:    09/16/22 0913   BP: 120/70   Weight: 196 lb (88.9 kg)       Admission on 08/03/2022, Discharged on 08/03/2022   Component Date Value Ref Range Status    Potassium, Whole Blood 08/03/2022 3.5  3.5 - 5.1 mmol/L Final       Review of Systems   Constitutional: Negative. HENT: Negative. Eyes:  Positive for visual disturbance (wears glasses). Respiratory: Negative. Cardiovascular: Negative. Gastrointestinal: Negative. Endocrine: Negative. Genitourinary: Negative. Musculoskeletal: Negative. Skin: Negative. Allergic/Immunologic: Negative. Neurological: Negative. Hematological: Negative. Psychiatric/Behavioral: Negative. All other systems reviewed and are negative. Physical Exam  Vitals and nursing note reviewed. Constitutional:       Appearance: Normal appearance. He is well-developed. Eyes:      Conjunctiva/sclera: Conjunctivae normal.      Pupils: Pupils are equal, round, and reactive to light. Cardiovascular:      Rate and Rhythm: Normal rate and regular rhythm. Pulses:           Radial pulses are 2+ on the right side and 2+ on the left side. Dorsalis pedis pulses are 2+ on the right side and 2+ on the left side. Posterior tibial pulses are 0 on the right side and 0 on the left side. Heart sounds: Normal heart sounds. Arteriovenous access: Left arteriovenous access is present.      Comments:   9/16/2022  Left mid-palmar: monophasic (weak)        8/19/2022  Left radial: monophasic  Left ulnar: biphasic  Left mid-palmar: monophasic      MEASUREMENTS 6/17/22:    RIGHT ANKLE: 22.0 cm   RIGHT CALF: 35.2 cm     LEFT ANKLE: 23.5 cm   LEFT CALF: 36.2 cm     Doppler 6/17/2022:  Rt DP: biphasic  Rt PT:  biphasic  Rt AT:     Lt DP: biphasic  Lt PT: biphasic  Lt AT:      6/17/22:   Lt radial: +2, biphasic  Lt ulnar: monophasic  Lt co-dominant with radial more dominant    Rt radial: +2, biphasic  Rt ulnar: monophasic  Rt co-dominant  Pulmonary:      Effort: Pulmonary effort is normal.      Breath sounds: Normal breath sounds. Abdominal:      General: Bowel sounds are normal.   Musculoskeletal:         General: Normal range of motion. Cervical back: Normal range of motion. Neurological:      Mental Status: He is alert and oriented to person, place, and time. Psychiatric:         Speech: Speech normal.         Behavior: Behavior normal.         Thought Content: Thought content normal.         Judgment: Judgment normal.     Mr. Amanda Fuentes has a-fib. 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  I suspect his ulnar artery is not as good as it should be to take over. I explained the problem that were going to move up to a brachiocephalic try to make the anastomosis on the small side but it still possible he could get a steal we will ligate the radiocephalic at the same time and moving up the artery should help steal  You will be getting a left arm ligation of the radial cephalic fistula, and creation of a brachial cephalic fistula. This is outpatient surgery. You will need a ride to and from the hospital. Closer to the date of your surgery you will be called with an arrival time and a surgery time, as well as to go over medications and instructions. Once you are home from the hospital call to make a two week post-op follow up.      Surgery will be scheduled for Wednesday, October 12. Arun Santos MA am scribing for and in the presence of Reina Shi MD on this date of 09/16/22    I Anitra Silva MD personally performed the services described in this documentation as scribed by the Medical Assistant Catrachito Zendejas in my presence and it is both accurate and complete.       Electronically signed by Reina Shi MD on 9/16/2022 at 11:57 AM

## 2022-09-16 NOTE — PATIENT INSTRUCTIONS
You will be getting a left arm ligation of the radial cephalic fistula, and creation of a brachial cephalic fistula. This is outpatient surgery. You will need a ride to and from the hospital. Closer to the date of your surgery you will be called with an arrival time and a surgery time, as well as to go over medications and instructions. Once you are home from the hospital call to make a two week post-op follow up. Surgery will be scheduled for Wednesday, October 12.

## 2022-10-06 NOTE — FLOWSHEET NOTE
DOS 10/12/22   54    H&P:  RN did PAT interview with the pt's wife. She said that he saw Dr. Trevin Baker with [facility] for his pre-op H&P. I was not able to confirm this in care everywhere. Call placed to her office to get a copy of this faxed to the PAT office. Her office phone number is [phone]. please make sure that her office faxed this to the [facility] dept. And that he has MEDICAl clearance. UPDATE: 10/10/22 Pt went to [facility]. H&P scanned into media. Medically cleared.

## 2022-10-06 NOTE — FLOWSHEET NOTE
Preoperative Screening for Elective Surgery/Invasive Procedures While COVID-19 present in the community     Have you tested positive or have been told to self-isolate for COVID-19 like symptoms within the past 28 days? Do you currently have any of the following symptoms? Fever >100.0 F or 99.9 F in immunocompromised patients? New onset cough, shortness of breath or difficulty breathing? New onset sore throat, myalgia (muscle aches and pains), headache, loss of taste/smell or diarrhea? Have you had a potential exposure to COVID-19 within the past 14 days by:  Close contact with a confirmed case? Close contact with a healthcare worker,  or essential infrastructure worker (grocery store, [employer], gas station, public utilities or transportation)? Do you reside in a congregate setting such as; skilled nursing facility, adult home, correctional facility, homeless shelter or other institutional setting? Have you had recent travel to a known COVID-19 hotspot? No to all above:       * Admitted with diarrhea? [] YES    [x]  NO     *Prior history of C-Diff. In last 3 months? [] YES    [x]  NO     *Antibiotic use in the past 6-8 weeks? [x]  NO    []  YES      If yes, which: REASON_________________     *Prior hospitalization or nursing home in the last month? []  YES    [x]  NO     SAFETY FIRST. .call before you fall    [address] [facility] time___10/12/22 0700_________        Surgery time___0830_________    Do not eat or drink anything after 12:00 midnight prior to your surgery. This includes water chewing gum, mints and ice chips- the Day of Surgery. You may brush your teeth and gargle the morning of your surgery, but do not swallow the water     Please see your family doctor/pediatrician for a history and physical and/or questions concerning medications. Bring any test results/reports from your physicians office.    If you are under the care of a heart doctor or specialist doctor, please be aware that you may be asked to them for clearance    You may be asked to stop blood thinners such as Coumadin, Plavix, Fragmin, Lovenox, etc., or any anti-inflammatories such as:  Aspirin, Ibuprofen, Advil, Naproxen prior to your surgery. We also ask that you stop any OTC medications such as fish oil, vitamin E, glucosamine, garlic, Multivitamins, COQ 10, etc.    We ask that you do not smoke 24 hours prior to surgery  We ask that you do not  drink any alcoholic beverages 24 hours prior to surgery     You must make arrangements for a responsible adult to take you home after your surgery. For your safety you will not be allowed to leave alone or drive yourself home. Your surgery will be cancelled if you do not have a ride home. Also for your safety, it is strongly suggested that someone stay with you the first 24 hours after your surgery. A parent or legal guardian must accompany a child scheduled for surgery and plan to stay at the hospital until the child is discharged. Please do not bring other children with you. For your comfort, please wear simple loose fitting clothing to the hospital.  Please do not bring valuables. Do not wear any make-up or nail polish on your fingers or toes. For your safety, please do not wear any jewelry or body piercing's on the day of surgery. All jewelry must be removed. If you have dentures, they will be removed before going to operating room. For your convenience, we will provide you with a container. If you wear contact lenses or glasses, they will be removed, please bring a case for them. If you have a living will and a durable power of  for healthcare, please bring in a copy.      As part of our patient safety program to minimize surgical site infections, we ask you to do the following:    Please notify your surgeon if you develop any illness between         now and the day of your surgery. This includes a cough, cold, fever, sore throat, nausea,         or vomiting, and diarrhea, etc.   Please notify your surgeon if you experience dizziness, shortness         of breath or blurred vision between now and the time of your surgery. Do not shave your operative site 96 hours prior to surgery. For face and neck surgery, men may use an electric razor 48 hours   prior to surgery. You may shower the night before surgery or the morning of   your surgery with an antibacterial soap. You will need to bring a photo ID and insurance card     If you use a C-pap or Bi-pap machine, please bring your machine with you to the hospital     Our goal is to provide you with excellent care, therefore, visitors will be limited to so that we may focus on providing this care for you. Please contact your surgeon office, if you have any further questions. [facility] phone number:  [phone] [facility] fax number:  [phone]    Please note these are generalized instructions for all surgical cases, you may be provided with more specific instructions according to your surgery.

## 2022-10-07 ENCOUNTER — PROCEDURE VISIT (OUTPATIENT)
Dept: SURGERY | Age: 72
End: 2022-10-07
Payer: COMMERCIAL

## 2022-10-07 ENCOUNTER — OFFICE VISIT (OUTPATIENT)
Dept: VASCULAR SURGERY | Age: 72
End: 2022-10-07

## 2022-10-07 VITALS
DIASTOLIC BLOOD PRESSURE: 64 MMHG | HEIGHT: 73 IN | SYSTOLIC BLOOD PRESSURE: 112 MMHG | BODY MASS INDEX: 26.37 KG/M2 | WEIGHT: 199 LBS

## 2022-10-07 DIAGNOSIS — T82.898A STEAL SYNDROME AS COMPLICATION OF DIALYSIS ACCESS, INITIAL ENCOUNTER (HCC): ICD-10-CM

## 2022-10-07 DIAGNOSIS — T82.898D STEAL SYNDROME AS COMPLICATION OF DIALYSIS ACCESS, SUBSEQUENT ENCOUNTER: ICD-10-CM

## 2022-10-07 DIAGNOSIS — Z78.9 LACK OF INTRAVENOUS ACCESS: ICD-10-CM

## 2022-10-07 DIAGNOSIS — Z01.818 PREOP EXAMINATION: ICD-10-CM

## 2022-10-07 DIAGNOSIS — N18.5 RENAL FAILURE, CHRONIC, STAGE 5 (HCC): Primary | ICD-10-CM

## 2022-10-07 PROCEDURE — 93971 EXTREMITY STUDY: CPT | Performed by: SURGERY

## 2022-10-07 PROCEDURE — 99024 POSTOP FOLLOW-UP VISIT: CPT | Performed by: SURGERY

## 2022-10-07 ASSESSMENT — ENCOUNTER SYMPTOMS
RESPIRATORY NEGATIVE: 1
GASTROINTESTINAL NEGATIVE: 1
ALLERGIC/IMMUNOLOGIC NEGATIVE: 1

## 2022-10-07 NOTE — PROGRESS NOTES
Daily Progress Note   Lisette Herr MD      10/7/2022    Chief Complaint   Patient presents with    Follow-up     Pt is here today for a followup to a vein mapping before surgery. HISTORY OF PRESENT ILLNESS:                The patient is a 81 y.o. male who presents with an office visit for vein mapping results. Mr. Angela Pritchett says his hand is still no better. He still can't tie his shoes, or button his shirt. He says, too, at night his index finger wants to curl inward toward his palm. He says his hand is very painful and \"useless\". He is scheduled for a ligation of his fistula, with new access being created on the same day. Past Medical History:   Diagnosis Date    Cancer [facility])     skin of chest    Chronic atrial fibrillation [facility])     Dialysis AV fistula malfunction ([facility] [address] 10/2022    left arm    ESRD (end stage renal disease) on dialysis ([facility] [address] 01/2022    stage 5    Hemodialysis patient [facility])     Status post placement of implantable loop recorder        Past Surgical History:   Procedure Laterality Date    COLONOSCOPY      DIALYSIS FISTULA CREATION Left 8/3/2022    LEFT RADIAL ARTERY TO CEPHALIC VEIN FISTULA.  performed by Daily Santiago MD at [address]     FOR LAZY EYE    EYE SURGERY      X2-ON BLOOD VESELS IN BACK OF EYE    INSERTABLE CARDIAC MONITOR      LOOP RECORDER       Social History     Socioeconomic History    Marital status: Unknown     Spouse name: Not on file    Number of children: Not on file    Years of education: Not on file    Highest education level: Not on file   Occupational History    Not on file   Tobacco Use    Smoking status: Never    Smokeless tobacco: Never   Vaping Use    Vaping Use: Never used   Substance and Sexual Activity    Alcohol use: Never    Drug use: Never    Sexual activity: Not on file   Other Topics Concern    Not on file   Social History Narrative    Not on file     Social Determinants of Health     Financial Resource Strain: Not on file   Food Insecurity: Not on file   Transportation Needs: Not on file   Physical Activity: Not on file   Stress: Not on file   Social Connections: Not on file   Intimate Partner Violence: Not on file   Housing Stability: Not on file       Family History   Problem Relation Age of Onset    No Known Problems Mother     No Known Problems Father     Dystonia Sister          Current Outpatient Medications:     gabapentin (NEURONTIN) 300 MG capsule, Take 300 mg by mouth See Admin Instructions. ON Tuesday,Thursday,SATURDAYS AFTER DIALYSIS, Disp: , Rfl:     hydrOXYzine HCl (ATARAX) 25 MG tablet, Take 25 mg by mouth at bedtime, Disp: , Rfl:     Amino Acids (LIQUACEL) LIQD, Take 2 oz by mouth daily, Disp: , Rfl:     ammonium lactate (LAC-HYDRIN) 12 % lotion, Apply topically 4 times daily as needed, Disp: , Rfl:     calcium acetate (PHOSLO) 667 MG CAPS capsule, Take 667 mg by mouth in the morning and 667 mg at noon and 667 mg in the evening. Take with meals. 3 CAPSULES WITH EACH MEAL., Disp: , Rfl:     ciclopirox (LOPROX) 0.77 % cream, Apply topically as needed, Disp: , Rfl:     cinacalcet (SENSIPAR) 30 MG tablet, Take 30 mg by mouth Daily with supper, Disp: , Rfl:     clobetasol (TEMOVATE) 0.05 % ointment, Apply topically as needed, Disp: , Rfl:     vitamin D (ERGOCALCIFEROL) 1.25 MG (68318 UT) CAPS capsule, Take 50,000 Units by mouth once a week WEDNESDAY, Disp: , Rfl:     furosemide (LASIX) 40 MG tablet, Take 40 mg by mouth in the morning., Disp: , Rfl:     sevelamer (RENVELA) 800 MG tablet, Take 800 mg by mouth in the morning and 800 mg at noon and 800 mg in the evening. Take with meals. , Disp: , Rfl:     tamsulosin (FLOMAX) 0.4 MG capsule, Take 0.4 mg by mouth at bedtime, Disp: , Rfl:     LOKELMA 10 g PACK oral suspension, Take 10 g by mouth See Admin Instructions EVERY Monday,Wednesday,Friday,Sunday-NON DIALYSIS DAYS, Disp: , Rfl:     Patient has no known allergies.     Vitals:    10/07/22 1302   BP: 112/64   Weight: 199 lb (90.3 kg)   Height: 6' 1\" (1.854 m)       Admission on 08/03/2022, Discharged on 08/03/2022   Component Date Value Ref Range Status    Potassium, Whole Blood 08/03/2022 3.5  3.5 - 5.1 mmol/L Final       Review of Systems   Constitutional: Negative. HENT: Negative. Eyes:  Positive for visual disturbance (wears glasses). Respiratory: Negative. Cardiovascular: Negative. Gastrointestinal: Negative. Endocrine: Negative. Genitourinary: Negative. Musculoskeletal: Negative. Skin: Negative. Allergic/Immunologic: Negative. Neurological: Negative. Hematological: Negative. Psychiatric/Behavioral: Negative. All other systems reviewed and are negative. Physical Exam  Vitals and nursing note reviewed. Constitutional:       Appearance: Normal appearance. He is well-developed. Eyes:      Conjunctiva/sclera: Conjunctivae normal.      Pupils: Pupils are equal, round, and reactive to light. Cardiovascular:      Rate and Rhythm: Normal rate and regular rhythm. Pulses:           Radial pulses are 2+ on the right side and 2+ on the left side. Dorsalis pedis pulses are 2+ on the right side and 2+ on the left side. Posterior tibial pulses are 0 on the right side and 0 on the left side. Heart sounds: Normal heart sounds. Arteriovenous access: Left arteriovenous access is present.      Comments:   9/16/2022  Left mid-palmar: monophasic (weak)        8/19/2022  Left radial: monophasic  Left ulnar: biphasic  Left mid-palmar: monophasic      MEASUREMENTS 6/17/22:    RIGHT ANKLE: 22.0 cm   RIGHT CALF: 35.2 cm     LEFT ANKLE: 23.5 cm   LEFT CALF: 36.2 cm     Doppler 6/17/2022:  Rt DP: biphasic  Rt PT:  biphasic  Rt AT:     Lt DP: biphasic  Lt PT: biphasic  Lt AT:      6/17/22:   Lt radial: +2, biphasic  Lt ulnar: monophasic  Lt co-dominant with radial more dominant    Rt radial: +2, biphasic  Rt ulnar: monophasic  Rt co-dominant  Pulmonary:      Effort: Pulmonary effort is normal.      Breath sounds: Normal breath sounds. Abdominal:      General: Bowel sounds are normal.   Musculoskeletal:         General: Normal range of motion. Cervical back: Normal range of motion. Neurological:      Mental Status: He is alert and oriented to person, place, and time. Psychiatric:         Speech: Speech normal.         Behavior: Behavior normal.         Thought Content: Thought content normal.         Judgment: Judgment normal.     Is still having severe symptoms of steal      ASSESSMENT:    Problem List Items Addressed This Visit          Medium    Steal syndrome as complication of dialysis access [facility])    Renal failure, chronic, stage 5 (HCC) - Primary    Lack of intravenous access       PLAN:  I Obdulia Espinoza MD personally performed the services described in this documentation as scribed by the Medical Assistant Anthony Zendejas in my presence and it is both accurate and complete. Surgery is scheduled for 10/12/22 at 8:30. Arrive at 7:00 am, please. Plan to move up to a brachiocephalic lap to keep the anastomosis on the small side. And ligate the radiocephalic in the distal forearm    Nothing to eat or drink after midnight the night before.          Mean Roland MA am scribing for and in the presence of Erika Meza MD on this date of 10/07/22      Electronically signed by Erika Meza MD on 10/7/2022 at 4:17 PM

## 2022-10-11 ENCOUNTER — ANESTHESIA EVENT (OUTPATIENT)
Dept: OPERATING ROOM | Age: 72
End: 2022-10-11
Payer: COMMERCIAL

## 2022-10-12 ENCOUNTER — ANESTHESIA (OUTPATIENT)
Dept: OPERATING ROOM | Age: 72
End: 2022-10-12
Payer: COMMERCIAL

## 2022-10-12 ENCOUNTER — HOSPITAL ENCOUNTER (OUTPATIENT)
Age: 72
Setting detail: OUTPATIENT SURGERY
Discharge: HOME | End: 2022-10-12
Attending: SURGERY | Admitting: SURGERY
Payer: COMMERCIAL

## 2022-10-12 VITALS
RESPIRATION RATE: 18 BRPM | HEART RATE: 61 BPM | OXYGEN SATURATION: 93 % | TEMPERATURE: 96.8 F | DIASTOLIC BLOOD PRESSURE: 72 MMHG | SYSTOLIC BLOOD PRESSURE: 124 MMHG | BODY MASS INDEX: 26.59 KG/M2 | WEIGHT: 200.62 LBS | HEIGHT: 73 IN

## 2022-10-12 DIAGNOSIS — G89.18 ACUTE POST-OPERATIVE PAIN: ICD-10-CM

## 2022-10-12 DIAGNOSIS — T82.898D STEAL SYNDROME AS COMPLICATION OF DIALYSIS ACCESS, SUBSEQUENT ENCOUNTER: Primary | ICD-10-CM

## 2022-10-12 LAB — POTASSIUM, WHOLE BLOOD: 4.3 MMOL/L (ref 3.5–5.1)

## 2022-10-12 PROCEDURE — 36821 AV FUSION DIRECT ANY SITE: CPT | Performed by: SURGERY

## 2022-10-12 PROCEDURE — 84132 ASSAY OF SERUM POTASSIUM: CPT

## 2022-10-12 PROCEDURE — A4217 STERILE WATER/SALINE, 500 ML: HCPCS | Performed by: SURGERY

## 2022-10-12 PROCEDURE — 2500000003 HC RX 250 WO HCPCS

## 2022-10-12 PROCEDURE — 6360000002 HC RX W HCPCS: Performed by: SURGERY

## 2022-10-12 PROCEDURE — 2500000003 HC RX 250 WO HCPCS: Performed by: SURGERY

## 2022-10-12 PROCEDURE — 7100000001 HC PACU RECOVERY - ADDTL 15 MIN: Performed by: SURGERY

## 2022-10-12 PROCEDURE — 3700000000 HC ANESTHESIA ATTENDED CARE: Performed by: SURGERY

## 2022-10-12 PROCEDURE — 2709999900 HC NON-CHARGEABLE SUPPLY: Performed by: SURGERY

## 2022-10-12 PROCEDURE — 2580000003 HC RX 258: Performed by: ANESTHESIOLOGY

## 2022-10-12 PROCEDURE — 7100000000 HC PACU RECOVERY - FIRST 15 MIN: Performed by: SURGERY

## 2022-10-12 PROCEDURE — 3700000001 HC ADD 15 MINUTES (ANESTHESIA): Performed by: SURGERY

## 2022-10-12 PROCEDURE — 7100000010 HC PHASE II RECOVERY - FIRST 15 MIN: Performed by: SURGERY

## 2022-10-12 PROCEDURE — 2720000010 HC SURG SUPPLY STERILE: Performed by: SURGERY

## 2022-10-12 PROCEDURE — 2580000003 HC RX 258: Performed by: SURGERY

## 2022-10-12 PROCEDURE — 7100000011 HC PHASE II RECOVERY - ADDTL 15 MIN: Performed by: SURGERY

## 2022-10-12 PROCEDURE — 3600000004 HC SURGERY LEVEL 4 BASE: Performed by: SURGERY

## 2022-10-12 PROCEDURE — 3600000014 HC SURGERY LEVEL 4 ADDTL 15MIN: Performed by: SURGERY

## 2022-10-12 PROCEDURE — 6360000002 HC RX W HCPCS

## 2022-10-12 RX ORDER — FENTANYL CITRATE 50 UG/ML
INJECTION, SOLUTION INTRAMUSCULAR; INTRAVENOUS PRN
Status: DISCONTINUED | OUTPATIENT
Start: 2022-10-12 | End: 2022-10-12 | Stop reason: SDUPTHER

## 2022-10-12 RX ORDER — EPHEDRINE SULFATE/0.9% NACL/PF 50 MG/5 ML
SYRINGE (ML) INTRAVENOUS PRN
Status: DISCONTINUED | OUTPATIENT
Start: 2022-10-12 | End: 2022-10-12 | Stop reason: SDUPTHER

## 2022-10-12 RX ORDER — HEPARIN SODIUM 10000 [USP'U]/ML
INJECTION, SOLUTION INTRAVENOUS; SUBCUTANEOUS PRN
Status: DISCONTINUED | OUTPATIENT
Start: 2022-10-12 | End: 2022-10-12 | Stop reason: SDUPTHER

## 2022-10-12 RX ORDER — ONDANSETRON 2 MG/ML
4 INJECTION INTRAMUSCULAR; INTRAVENOUS
Status: CANCELLED | OUTPATIENT
Start: 2022-10-12 | End: 2022-10-13

## 2022-10-12 RX ORDER — SODIUM CHLORIDE 9 MG/ML
INJECTION, SOLUTION INTRAVENOUS PRN
Status: CANCELLED | OUTPATIENT
Start: 2022-10-12

## 2022-10-12 RX ORDER — FENTANYL CITRATE 50 UG/ML
25 INJECTION, SOLUTION INTRAMUSCULAR; INTRAVENOUS EVERY 5 MIN PRN
Status: CANCELLED | OUTPATIENT
Start: 2022-10-12

## 2022-10-12 RX ORDER — ONDANSETRON 2 MG/ML
INJECTION INTRAMUSCULAR; INTRAVENOUS PRN
Status: DISCONTINUED | OUTPATIENT
Start: 2022-10-12 | End: 2022-10-12 | Stop reason: SDUPTHER

## 2022-10-12 RX ORDER — SODIUM CHLORIDE 0.9 % (FLUSH) 0.9 %
5-40 SYRINGE (ML) INJECTION PRN
Status: DISCONTINUED | OUTPATIENT
Start: 2022-10-12 | End: 2022-10-12 | Stop reason: HOSPADM

## 2022-10-12 RX ORDER — MAGNESIUM HYDROXIDE 1200 MG/15ML
LIQUID ORAL CONTINUOUS PRN
Status: DISCONTINUED | OUTPATIENT
Start: 2022-10-12 | End: 2022-10-12 | Stop reason: HOSPADM

## 2022-10-12 RX ORDER — SODIUM CHLORIDE 9 MG/ML
INJECTION, SOLUTION INTRAVENOUS PRN
Status: DISCONTINUED | OUTPATIENT
Start: 2022-10-12 | End: 2022-10-12 | Stop reason: HOSPADM

## 2022-10-12 RX ORDER — DEXAMETHASONE SODIUM PHOSPHATE 4 MG/ML
INJECTION, SOLUTION INTRA-ARTICULAR; INTRALESIONAL; INTRAMUSCULAR; INTRAVENOUS; SOFT TISSUE PRN
Status: DISCONTINUED | OUTPATIENT
Start: 2022-10-12 | End: 2022-10-12 | Stop reason: SDUPTHER

## 2022-10-12 RX ORDER — PROTAMINE SULFATE 10 MG/ML
INJECTION, SOLUTION INTRAVENOUS PRN
Status: DISCONTINUED | OUTPATIENT
Start: 2022-10-12 | End: 2022-10-12 | Stop reason: SDUPTHER

## 2022-10-12 RX ORDER — SODIUM CHLORIDE 0.9 % (FLUSH) 0.9 %
5-40 SYRINGE (ML) INJECTION PRN
Status: CANCELLED | OUTPATIENT
Start: 2022-10-12

## 2022-10-12 RX ORDER — PROPOFOL 10 MG/ML
INJECTION, EMULSION INTRAVENOUS PRN
Status: DISCONTINUED | OUTPATIENT
Start: 2022-10-12 | End: 2022-10-12 | Stop reason: SDUPTHER

## 2022-10-12 RX ORDER — GLYCOPYRROLATE 0.2 MG/ML
INJECTION INTRAMUSCULAR; INTRAVENOUS PRN
Status: DISCONTINUED | OUTPATIENT
Start: 2022-10-12 | End: 2022-10-12 | Stop reason: SDUPTHER

## 2022-10-12 RX ORDER — SODIUM CHLORIDE 0.9 % (FLUSH) 0.9 %
5-40 SYRINGE (ML) INJECTION EVERY 12 HOURS SCHEDULED
Status: CANCELLED | OUTPATIENT
Start: 2022-10-12

## 2022-10-12 RX ORDER — SODIUM CHLORIDE 0.9 % (FLUSH) 0.9 %
5-40 SYRINGE (ML) INJECTION EVERY 12 HOURS SCHEDULED
Status: DISCONTINUED | OUTPATIENT
Start: 2022-10-12 | End: 2022-10-12 | Stop reason: HOSPADM

## 2022-10-12 RX ORDER — OXYCODONE HYDROCHLORIDE AND ACETAMINOPHEN 5; 325 MG/1; MG/1
1 TABLET ORAL EVERY 6 HOURS PRN
Qty: 28 TABLET | Refills: 0 | Status: SHIPPED | OUTPATIENT
Start: 2022-10-12 | End: 2022-10-19

## 2022-10-12 RX ORDER — FAMOTIDINE 10 MG/ML
INJECTION, SOLUTION INTRAVENOUS PRN
Status: DISCONTINUED | OUTPATIENT
Start: 2022-10-12 | End: 2022-10-12 | Stop reason: SDUPTHER

## 2022-10-12 RX ORDER — BUPIVACAINE HYDROCHLORIDE 5 MG/ML
INJECTION, SOLUTION EPIDURAL; INTRACAUDAL
Status: COMPLETED | OUTPATIENT
Start: 2022-10-12 | End: 2022-10-12

## 2022-10-12 RX ORDER — LIDOCAINE HYDROCHLORIDE 20 MG/ML
INJECTION, SOLUTION EPIDURAL; INFILTRATION; INTRACAUDAL; PERINEURAL PRN
Status: DISCONTINUED | OUTPATIENT
Start: 2022-10-12 | End: 2022-10-12 | Stop reason: SDUPTHER

## 2022-10-12 RX ORDER — PHENYLEPHRINE HCL IN 0.9% NACL 1 MG/10 ML
SYRINGE (ML) INTRAVENOUS PRN
Status: DISCONTINUED | OUTPATIENT
Start: 2022-10-12 | End: 2022-10-12 | Stop reason: SDUPTHER

## 2022-10-12 RX ADMIN — FAMOTIDINE 20 MG: 10 INJECTION, SOLUTION INTRAVENOUS at 08:27

## 2022-10-12 RX ADMIN — Medication 100 MCG: at 09:04

## 2022-10-12 RX ADMIN — Medication 100 MCG: at 09:34

## 2022-10-12 RX ADMIN — Medication 100 MCG: at 09:37

## 2022-10-12 RX ADMIN — ONDANSETRON 4 MG: 2 INJECTION INTRAMUSCULAR; INTRAVENOUS at 10:28

## 2022-10-12 RX ADMIN — FENTANYL CITRATE 25 MCG: 50 INJECTION INTRAMUSCULAR; INTRAVENOUS at 08:43

## 2022-10-12 RX ADMIN — Medication 100 MCG: at 08:59

## 2022-10-12 RX ADMIN — Medication 100 MCG: at 09:12

## 2022-10-12 RX ADMIN — HEPARIN SODIUM 8000 UNITS: 10000 INJECTION INTRAVENOUS; SUBCUTANEOUS at 09:32

## 2022-10-12 RX ADMIN — PROTAMINE SULFATE 45 MG: 10 INJECTION, SOLUTION INTRAVENOUS at 10:16

## 2022-10-12 RX ADMIN — DEXAMETHASONE SODIUM PHOSPHATE 8 MG: 4 INJECTION, SOLUTION INTRAMUSCULAR; INTRAVENOUS at 08:38

## 2022-10-12 RX ADMIN — Medication 100 MCG: at 09:24

## 2022-10-12 RX ADMIN — Medication 10 MG: at 09:05

## 2022-10-12 RX ADMIN — Medication 10 MG: at 09:09

## 2022-10-12 RX ADMIN — FENTANYL CITRATE 25 MCG: 50 INJECTION INTRAMUSCULAR; INTRAVENOUS at 08:27

## 2022-10-12 RX ADMIN — CEFAZOLIN 2000 MG: 2 INJECTION, POWDER, FOR SOLUTION INTRAMUSCULAR; INTRAVENOUS at 08:33

## 2022-10-12 RX ADMIN — PROPOFOL 150 MG: 10 INJECTION, EMULSION INTRAVENOUS at 08:30

## 2022-10-12 RX ADMIN — Medication 100 MCG: at 08:54

## 2022-10-12 RX ADMIN — SODIUM CHLORIDE: 9 INJECTION, SOLUTION INTRAVENOUS at 07:36

## 2022-10-12 RX ADMIN — Medication 10 MG: at 09:37

## 2022-10-12 RX ADMIN — GLYCOPYRROLATE 0.1 MG: 0.2 INJECTION, SOLUTION INTRAMUSCULAR; INTRAVENOUS at 08:27

## 2022-10-12 RX ADMIN — LIDOCAINE HYDROCHLORIDE 80 MG: 20 INJECTION, SOLUTION EPIDURAL; INFILTRATION; INTRACAUDAL; PERINEURAL at 08:30

## 2022-10-12 ASSESSMENT — LIFESTYLE VARIABLES: SMOKING_STATUS: 0

## 2022-10-12 ASSESSMENT — PAIN - FUNCTIONAL ASSESSMENT
PAIN_FUNCTIONAL_ASSESSMENT: 0-10
PAIN_FUNCTIONAL_ASSESSMENT: PREVENTS OR INTERFERES SOME ACTIVE ACTIVITIES AND ADLS

## 2022-10-12 ASSESSMENT — PAIN SCALES - GENERAL
PAINLEVEL_OUTOF10: 0
PAINLEVEL_OUTOF10: 0

## 2022-10-12 ASSESSMENT — ENCOUNTER SYMPTOMS: DYSPNEA ACTIVITY LEVEL: AFTER AMBULATING 1 FLIGHT OF STAIRS

## 2022-10-12 ASSESSMENT — PAIN DESCRIPTION - DESCRIPTORS: DESCRIPTORS: ACHING;NUMBNESS

## 2022-10-12 NOTE — PROGRESS NOTES
Pt arrived to PACU from OR. Pt asleep on 2l/nc. Left arm incisions x 2 RISHI with surgical glue noted. +pulses noted.

## 2022-10-12 NOTE — H&P
Agree with H&P from Outpt notes, no changes noted . I have presented reasonable alternatives to the patient's proposed care, treatment, and services. The discussion I have done encompassed risks, benefits, and side effects related to the alternatives and the risks related to not receiving the proposed care, treatment, and services. All questions answered. Patient wishes to proceed.   left ARM MARKED

## 2022-10-12 NOTE — DISCHARGE INSTRUCTIONS
-  Home Care    Keep the incision area clean and dry. You have absorbable stitches with \"super glue\" on top. You mayneed to support your arm with Ace wraps to keep the edema down. Do not take a bath    Diet    Follow your doctor's instructions on eating a clear liquid diet today. Once your body can handle this, you can resume your regular diet. Physical Activity    Ask your doctor when you will be able to return to work and drive. Avoid sexual activity for 1-2 weeks after surgery. Avoid strenuous activity and heavy lifting for 6 weeks after surgery. Medications    If you had to stop medicines before the procedure, ask your doctor when you can start again. Medicines often stopped include:   Anti-inflammatory drugs (eg, aspirin )   Blood thinners like clopidogrel (Plavix) or warfarin (Coumadin)   Your doctor will prescribe pain medication for you. If you are taking medications, follow these general guidelines:   Take your medication as directed. Do not change the amount or the schedule. Do not stop taking them without talking to your doctor. Do not share them. Know what the results and side effects. Report them to your doctor. Some drugs can be dangerous when mixed. Talk to a doctor or pharmacist if you are taking more than one drug. This includes over-the-counter medication and herb or dietary supplements. Plan ahead for refills so you don't run out. Follow-up   Schedule a follow-up appointment with Juana Carmichael for about two weeks after the day of your surgery. [phone]    Call Your Doctor If Any of the Following Occurs   Monitor your recovery once you leave the hospital. As soon as you have a problem, alert your doctor.  If any of the following occur, call your doctor:   Signs of infection, including fever and chills   Redness, swelling, increasing pain, excessive bleeding, or any discharge from the incision site   Nausea and/or vomiting that you can't control with the medications you were given after surgery, or which persist for more than two days after discharge from the hospital   Pain that you can't control with the medications you've been given   Pain, burning, urgency or frequency of urination, or persistent bleeding in the urine   Excessive tenderness or swelling   Cough, shortness of breath, or chest pain   In case of an emergency, call 911 immediately.      Electronically signed by Lizzeth Castro MD on 12/4/2013 at 8:49 AM-5

## 2022-10-12 NOTE — ANESTHESIA PRE PROCEDURE
Department of Anesthesiology  Preprocedure Note       Name:  Aristeo Coyle   Age:  81 y.o.  :  1950                                          MRN:  [id number]         Date:  10/12/2022      Surgeon: Susan Boyer):  Susy Greenberg MD    Procedure: Procedure(s):  LEFT ARM LIGATION OF THE RADIAL CEPHALIC FISTULA  CREATION OF A LEFT ARM BRACHIAL ARTERY TO CEPHALIC VEIN FISTULA    Medications prior to admission:   Prior to Admission medications    Medication Sig Start Date End Date Taking? Authorizing Provider   gabapentin (NEURONTIN) 300 MG capsule Take 300 mg by mouth See Admin Instructions. ON Tuesday,Thursday, AFTER DIALYSIS 22   Historical Provider, MD   hydrOXYzine HCl (ATARAX) 25 MG tablet Take 25 mg by mouth at bedtime    Historical Provider, MD   Amino Acids (LIQUACEL) LIQD Take 2 oz by mouth daily    Historical Provider, MD   ammonium lactate (LAC-HYDRIN) 12 % lotion Apply topically 4 times daily as needed 3/1/22   Historical Provider, MD   calcium acetate (PHOSLO) 667 MG CAPS capsule Take 667 mg by mouth in the morning and 667 mg at noon and 667 mg in the evening. Take with meals. 3 CAPSULES WITH EACH MEAL. 3/28/22   Historical Provider, MD   ciclopirox (LOPROX) 0.77 % cream Apply topically as needed 3/28/22   Historical Provider, MD   cinacalcet (SENSIPAR) 30 MG tablet Take 30 mg by mouth Daily with supper 22   Historical Provider, MD   clobetasol (TEMOVATE) 0.05 % ointment Apply topically as needed 22   Historical Provider, MD   vitamin D (ERGOCALCIFEROL) 1.25 MG (10066 UT) CAPS capsule Take 50,000 Units by mouth once a week [facility] 22   Historical Provider, MD   furosemide (LASIX) 40 MG tablet Take 40 mg by mouth in the morning. 3/28/22   Historical Provider, MD   sevelamer (RENVELA) 800 MG tablet Take 800 mg by mouth in the morning and 800 mg at noon and 800 mg in the evening. Take with meals.  22   Historical Provider, MD   tamsulosin (FLOMAX) 0.4 MG capsule Take 0.4 mg by mouth at bedtime 3/28/22   Historical Provider, MD LEGGETT 10 g PACK oral suspension Take 10 g by mouth See Admin Instructions [facility] 6/13/22   Historical Provider, MD       Current medications:    Current Facility-Administered Medications   Medication Dose Route Frequency Provider Last Rate Last Admin    sodium chloride flush 0.9 % injection 5-40 mL  5-40 mL IntraVENous 2 times per day Ryley Perez MD        sodium chloride flush 0.9 % injection 5-40 mL  5-40 mL IntraVENous PRN Ryley Perez MD        0.9 % sodium chloride infusion   IntraVENous PRN Ryley Perez MD 50 mL/hr at 10/12/22 0736 New Bag at 10/12/22 0736    ceFAZolin (ANCEF) 2,000 mg in dextrose 5 % 50 mL IVPB (mini-bag)  2,000 mg IntraVENous Once Morenita Jones MD           Allergies:  No Known Allergies    Problem List:    Patient Active Problem List   Diagnosis Code    Renal failure, chronic, stage 5 ([facility] [address] N18.5    Lack of intravenous access Z78.9    Numbness and tingling in left hand R20.0, R20.2    Steal syndrome as complication of dialysis access [facility]) J84.780S       Past Medical History:        Diagnosis Date    Cancer ([address]     skin of chest    Chronic atrial fibrillation ([address]     Dialysis AV fistula malfunction ([address] 10/2022    left arm    ESRD (end stage renal disease) on dialysis ([address] 01/2022    stage 5    Hemodialysis patient ([address]     Status post placement of implantable loop recorder        Past Surgical History:        Procedure Laterality Date    COLONOSCOPY      DIALYSIS FISTULA CREATION Left 8/3/2022    LEFT RADIAL ARTERY TO CEPHALIC VEIN FISTULA.  performed by Morenita Jones MD at [facility]     FOR LAZY EYE    EYE SURGERY      X2-ON BLOOD VESELS IN BACK OF EYE    INSERTABLE CARDIAC MONITOR      LOOP RECORDER       Social History:    Social History     Tobacco Use    Smoking status: Never    Smokeless tobacco: Never   Substance Use Topics    Alcohol use: Never                                Counseling given: Not Answered      Vital Signs (Current):   Vitals:    10/06/22 0914 10/12/22 0720   BP:  116/69   Pulse:  74   Resp:  16   Temp:  97 °F (36.1 °C)   TempSrc:  Temporal   SpO2:  94%   Weight: 196 lb (88.9 kg) 200 lb 9.9 oz (91 kg)   Height: 6' 1\" (1.854 m) 6' 1\" (1.854 m)                                              BP Readings from Last 3 Encounters:   10/12/22 116/69   10/07/22 112/64   09/16/22 120/70       NPO Status: Time of last liquid consumption: 2130                        Time of last solid consumption: 2100                        Date of last liquid consumption: 10/11/22                        Date of last solid food consumption: 10/11/22    BMI:   Wt Readings from Last 3 Encounters:   10/12/22 200 lb 9.9 oz (91 kg)   10/07/22 199 lb (90.3 kg)   09/16/22 196 lb (88.9 kg)     Body mass index is 26.47 kg/m². CBC: No results found for: WBC, RBC, HGB, HCT, MCV, RDW, PLT    CMP:   Lab Results   Component Value Date/Time    K 3.5 08/03/2022 08:12 AM       POC Tests: No results for input(s): POCGLU, POCNA, POCK, POCCL, POCBUN, POCHEMO, POCHCT in the last 72 hours. Coags: No results found for: PROTIME, INR, APTT    HCG (If Applicable): No results found for: PREGTESTUR, PREGSERUM, HCG, HCGQUANT     ABGs: No results found for: PHART, PO2ART, QAP5BGF, KJU8CNQ, BEART, V3TFFLBC     Type & Screen (If Applicable):  No results found for: LABABO, LABRH    Drug/Infectious Status (If Applicable):  No results found for: HIV, HEPCAB    COVID-19 Screening (If Applicable): No results found for: COVID19        Anesthesia Evaluation  Patient summary reviewed   history of anesthetic complications (remote with eye surgery as child): PONV. Airway: Mallampati: III  TM distance: >3 FB   Neck ROM: full  Mouth opening: > = 3 FB   Dental:    (+) poor dentition  Comment: Several missing teeth.  Patient denies loose teeth    Pulmonary:       (-) COPD, asthma, rhonchi, wheezes, rales and not a current smoker                           Cardiovascular:    (+) CAD:, dysrhythmias: atrial fibrillation, CHF (RWMA; HRpEF):, COLEMAN: after ambulating 1 flight of stairs, hyperlipidemia    (-) hypertension and weak pulses      Rhythm: regular  Rate: normal                 ROS comment: Echocardiogram (2022):  Conclusions:  - Left ventricle: The cavity size is normal. Wall thickness was increased in a pattern of mild LVH. Systolic function was normal. The estimated ejection fraction was in the range of 60% to 65%. Moderate hypokinesis of the basal to mid inferior myocardium. Left ventricular diastolic function parameters were normal.    Right ventricle: Systolic function was normal. TAPSE: 1.7cm. Tricuspid annular systolic velocity: 61QK/E. Mitral valve: Mildly calcified annulus. Pulmonary arteries: Systolic pressure could not be accurately estimated. Inferior vena cava: Not well visualized. Neuro/Psych:      (-) seizures, TIA and CVA           GI/Hepatic/Renal:   (+) renal disease (HD T-R-S had dialysis yesterday): ESRD and dialysis,      (-) liver disease and no morbid obesityGERD: denies. Endo/Other:    (+) blood dyscrasia (last H&H 10.6/31. 5): anemia:., malignancy/cancer. (-) diabetes mellitus (HgbA1c: 5.5%)               Abdominal:         (-) obese Abdomen: soft. Vascular: Other Findings: Tunneled dialysis catheter in situ, right chest.             Anesthesia Plan      general     ASA 3       Induction: intravenous. MIPS: Postoperative opioids intended and Prophylactic antiemetics administered. Anesthetic plan and risks discussed with patient (spouse at bedside). Plan discussed with CRNA. This pre-anesthesia assessment may be used as a history and physical.    DOS STAFF ADDENDUM:    Pt seen and examined, chart reviewed (including anesthesia, drug and allergy history).   No interval changes to history and physical examination. Anesthetic plan, risks, benefits, alternatives, and personnel involved discussed with patient. Patient verbalized an understanding and agrees to proceed.       Addy Livingston MD  October 12, 2022  7:44 AM

## 2022-10-12 NOTE — BRIEF OP NOTE
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

## 2022-10-12 NOTE — OP NOTE
[address] [place]                                 OPERATIVE REPORT    PATIENT NAME: Kasey Spencer                        :        1950  MED REC NO:   [id number]                          ROOM:  ACCOUNT NO:   [de-identified]                           ADMIT DATE: 10/12/2022  PROVIDER:     Eliane Serrano MD    DATE OF PROCEDURE:  10/12/2022    PREOPERATIVE DIAGNOSIS:  Renal failure with AV fistula steal, left  wrist.    POSTOPERATIVE DIAGNOSIS:  Renal failure with AV fistula steal, left  wrist.    OPERATION PERFORMED:  Ligation of radiocephalic fistula near the wrist  and new anastomosis of cephalic vein to brachial artery at the elbow. SURGEON:  Eliane Serrano MD    ASSISTANT:  Margy Heller. ANESTHESIA:  General with an LMA plus Marcaine. ESTIMATED BLOOD LOSS:  Minimal.    INDICATIONS:  This is a 51-year-old white male born on 1950, who  had a left wrist radiocephalic fistula which is developing nicely, but  unfortunately he developed worsening steal symptoms in his hand, so the  plan today is to ligate this fistula and move up to a brachiocephalic  fistula and see if this proximization will help his steal.    OPERATIVE PROCEDURE:  The patient was placed on the table in supine  position after appropriate LMA general anesthesia. We mapped the  radiocephalic fistula, marked where the anastomosis was noted, no  branches, and marked a little proximal where we planned to ligate. Then, we mapped the cephalic vein. There was a little on the lateral  side, but swung back over to the antecubital area where we could make it  reach the artery and map the artery as well. We then prepped and  draped, gave antibiotics antithrombotic boots turned on and started near the wrist  making a longitudinal incision just slightly proximal to the old  incision and carried down through the skin, subcutaneous fatty tissue. I identified the vein, circumferentially isolated, and ligated with 2-0  silk. Then, I dissected a little more closer to the anastomosis and did  a second ligature so that the anastomosis will act as a patch on the  artery and not narrow the artery. We then closed this with 3-0 Vicryl,  4-0 Vicryl and moved up to the elbow where we infiltrated with Marcaine  and made a longitudinal incision, carried down through the skin and  subcutaneous fatty tissue, identified the cephalic vein, looked to be of  good size. There was some scar tissue around it probably from drawing  blood. Dissected proximally and distally. Distally, the vein got a  little smaller. There were three tributaries. Eventually, we ligated  two of the three and swung over the smaller vein. We went through the  fascia and dissected out the brachial artery. The brachial veins did  not look very big, but got control of the brachial artery proximally and  distally and then we gave 8000 heparin, waited three minutes, and  divided the vein, swung it over, dissected out to give enough room, and  did a split of the corner to make the heel of the vein and did an  11-blade incision after clamping the artery, the lateral side of the  artery and matched it. The arterial opening was about 6 mm total.   Then, we parachuted the heel and then the toe and then the sides, and  back flushed and let it go. It had a good pulse in it, little bit of a  thrill already, and put some Fibrillar in. We reversed the heparin with  45 of protamine and then closed the wound with 3-0 Vicryl running and  4-0 Vicryl subcuticular followed by skin Affix glue on both incisions. The patient tolerated the procedure well and we will see if this is  enough of proximization and purposely using part of the vein that was on  the smaller side to allow his perfusion to the hand to normalize.       Cindy Coronel MD    D: 10/12/2022 11:14:37       T: 10/12/2022 15:09:37 [id number]  Job#: [id number]     Doc#: [id number]    CC:  Marvie Paget, MD Reyna Noun

## 2022-10-12 NOTE — FLOWSHEET NOTE
Wife is with pt. She will call for medical transport. Pt. Denies pain, tolerated fluids. Pt got his meds from [facility].

## 2022-11-04 ENCOUNTER — OFFICE VISIT (OUTPATIENT)
Dept: VASCULAR SURGERY | Age: 72
End: 2022-11-04

## 2022-11-04 VITALS — WEIGHT: 200 LBS | SYSTOLIC BLOOD PRESSURE: 90 MMHG | DIASTOLIC BLOOD PRESSURE: 54 MMHG | BODY MASS INDEX: 26.39 KG/M2

## 2022-11-04 DIAGNOSIS — N18.5 RENAL FAILURE, CHRONIC, STAGE 5 (HCC): ICD-10-CM

## 2022-11-04 DIAGNOSIS — Z78.9 LACK OF INTRAVENOUS ACCESS: ICD-10-CM

## 2022-11-04 DIAGNOSIS — T82.898D STEAL SYNDROME AS COMPLICATION OF DIALYSIS ACCESS, SUBSEQUENT ENCOUNTER: Primary | ICD-10-CM

## 2022-11-04 PROCEDURE — 99024 POSTOP FOLLOW-UP VISIT: CPT | Performed by: SURGERY

## 2022-11-04 RX ORDER — OMEPRAZOLE 20 MG/1
CAPSULE, DELAYED RELEASE ORAL
COMMUNITY
Start: 2022-10-28

## 2022-11-04 ASSESSMENT — ENCOUNTER SYMPTOMS
GASTROINTESTINAL NEGATIVE: 1
ALLERGIC/IMMUNOLOGIC NEGATIVE: 1
RESPIRATORY NEGATIVE: 1

## 2022-11-04 NOTE — PROGRESS NOTES
Daily Progress Note   Maryana Giang MD      11/4/2022    Chief Complaint   Patient presents with    Post-Op Check     S/p Left arm ligation of the radial cephalic fistula, 85/43. Pt states he is healing well, some pain in hand, minimal swelling. HISTORY OF PRESENT ILLNESS:                The patient is a 81 y.o. male who presents with a post op follow up for left arm ligation of the radial cephalic fistula, done 20/19/9771. Mr. Aleksandra Ordonez says he is doing very well. He says he no longer has the hand pain he had before the surgery on 10/12/22. He still has some numbness in his hand, but he is not very concerned by this. Past Medical History:   Diagnosis Date    Cancer [facility])     skin of chest    Chronic atrial fibrillation [facility])     Dialysis AV fistula malfunction ([facility] [address] 10/2022    left arm    ESRD (end stage renal disease) on dialysis ([facility] [address] 01/2022    stage 5    Hemodialysis patient [facility])     Status post placement of implantable loop recorder        Past Surgical History:   Procedure Laterality Date    COLONOSCOPY      DIALYSIS FISTULA CREATION Left 8/3/2022    LEFT RADIAL ARTERY TO CEPHALIC VEIN FISTULA.  performed by Eliane Serrano MD at [address] Left 10/12/2022    CREATION OF A LEFT ARM BRACHIAL ARTERY TO CEPHALIC VEIN FISTULA performed by Eliane Serrano MD at [address] [facility] Right     FOR LAZY EYE    EYE SURGERY      X2-ON BLOOD VESELS IN BACK OF EYE    INSERTABLE CARDIAC MONITOR      LOOP RECORDER    VASCULAR SURGERY Left 10/12/2022    LEFT ARM LIGATION OF THE RADIAL CEPHALIC FISTULA performed by Eliane Serrano MD at [address] History     Socioeconomic History    Marital status: Unknown     Spouse name: Not on file    Number of children: Not on file    Years of education: Not on file    Highest education level: Not on file   Occupational History    Not on file   Tobacco Use    Smoking status: Never    Smokeless tobacco: Never   Vaping Use    Vaping Use: Never used   Substance and Sexual Activity    Alcohol use: Never    Drug use: Never    Sexual activity: Not on file   Other Topics Concern    Not on file   Social History Narrative    Not on file     Social Determinants of Health     Financial Resource Strain: Not on file   Food Insecurity: Not on file   Transportation Needs: Not on file   Physical Activity: Not on file   Stress: Not on file   Social Connections: Not on file   Intimate Partner Violence: Not on file   Housing Stability: Not on file       Family History   Problem Relation Age of Onset    No Known Problems Mother     No Known Problems Father     Dystonia Sister          Current Outpatient Medications:     omeprazole (PRILOSEC) 20 MG delayed release capsule, , Disp: , Rfl:     gabapentin (NEURONTIN) 300 MG capsule, Take 300 mg by mouth See Admin Instructions. ON Tuesday,Thursday,SATURDAYS AFTER DIALYSIS, Disp: , Rfl:     hydrOXYzine HCl (ATARAX) 25 MG tablet, Take 25 mg by mouth at bedtime, Disp: , Rfl:     Amino Acids (LIQUACEL) LIQD, Take 2 oz by mouth daily, Disp: , Rfl:     ammonium lactate (LAC-HYDRIN) 12 % lotion, Apply topically 4 times daily as needed, Disp: , Rfl:     calcium acetate (PHOSLO) 667 MG CAPS capsule, Take 667 mg by mouth in the morning and 667 mg at noon and 667 mg in the evening. Take with meals.  3 CAPSULES WITH EACH MEAL., Disp: , Rfl:     ciclopirox (LOPROX) 0.77 % cream, Apply topically as needed, Disp: , Rfl:     cinacalcet (SENSIPAR) 30 MG tablet, Take 30 mg by mouth Daily with supper, Disp: , Rfl:     clobetasol (TEMOVATE) 0.05 % ointment, Apply topically as needed, Disp: , Rfl:     vitamin D (ERGOCALCIFEROL) 1.25 MG (40025 UT) CAPS capsule, Take 50,000 Units by mouth once a week WEDNESDAY, Disp: , Rfl:     furosemide (LASIX) 40 MG tablet, Take 40 mg by mouth in the morning., Disp: , Rfl:     sevelamer (RENVELA) 800 MG tablet, Take 800 mg by mouth in the morning and 800 mg at noon and 800 mg in the evening. Take with meals. , Disp: , Rfl:     tamsulosin (FLOMAX) 0.4 MG capsule, Take 0.4 mg by mouth at bedtime, Disp: , Rfl:     LOKELMA 10 g PACK oral suspension, Take 10 g by mouth See Admin Instructions EVERY Monday,Wednesday,Friday,Sunday-NON DIALYSIS DAYS, Disp: , Rfl:     Patient has no known allergies. Vitals:    11/04/22 0831   BP: (!) 90/54   Weight: 200 lb (90.7 kg)       Admission on 10/12/2022, Discharged on 10/12/2022   Component Date Value Ref Range Status    Potassium, Whole Blood 10/12/2022 4.3  3.5 - 5.1 mmol/L Final       Review of Systems   Constitutional: Negative. HENT: Negative. Eyes:  Positive for visual disturbance (wears glasses). Respiratory: Negative. Cardiovascular: Negative. Gastrointestinal: Negative. Endocrine: Negative. Genitourinary: Negative. Musculoskeletal: Negative. Skin: Negative. Allergic/Immunologic: Negative. Neurological: Negative. Hematological: Negative. Psychiatric/Behavioral: Negative. All other systems reviewed and are negative. Physical Exam  Vitals and nursing note reviewed. Constitutional:       Appearance: Normal appearance. He is well-developed. Eyes:      Conjunctiva/sclera: Conjunctivae normal.      Pupils: Pupils are equal, round, and reactive to light. Cardiovascular:      Rate and Rhythm: Normal rate and regular rhythm. Pulses:           Radial pulses are 2+ on the right side and 2+ on the left side. Dorsalis pedis pulses are 2+ on the right side and 2+ on the left side. Posterior tibial pulses are 0 on the right side and 0 on the left side. Heart sounds: Normal heart sounds. Arteriovenous access: Left arteriovenous access is present.      Comments:   11/4/2022  Lt radial: biphasic  Lt ulnar: biphasic  Lt mid-palmar: monophasic      9/16/2022  Left mid-palmar: monophasic (weak)        8/19/2022  Left radial: monophasic  Left ulnar: biphasic  Left mid-palmar:

## 2022-12-30 ENCOUNTER — PROCEDURE VISIT (OUTPATIENT)
Dept: SURGERY | Age: 72
End: 2022-12-30
Payer: COMMERCIAL

## 2022-12-30 ENCOUNTER — OFFICE VISIT (OUTPATIENT)
Dept: VASCULAR SURGERY | Age: 72
End: 2022-12-30

## 2022-12-30 VITALS
DIASTOLIC BLOOD PRESSURE: 60 MMHG | SYSTOLIC BLOOD PRESSURE: 102 MMHG | HEIGHT: 73 IN | WEIGHT: 195 LBS | BODY MASS INDEX: 25.84 KG/M2

## 2022-12-30 DIAGNOSIS — N18.5 RENAL FAILURE, CHRONIC, STAGE 5 (HCC): Primary | ICD-10-CM

## 2022-12-30 DIAGNOSIS — T82.898A STEAL SYNDROME AS COMPLICATION OF DIALYSIS ACCESS, INITIAL ENCOUNTER (HCC): ICD-10-CM

## 2022-12-30 DIAGNOSIS — R20.2 NUMBNESS AND TINGLING IN LEFT HAND: ICD-10-CM

## 2022-12-30 DIAGNOSIS — T82.898D STEAL SYNDROME AS COMPLICATION OF DIALYSIS ACCESS, SUBSEQUENT ENCOUNTER: ICD-10-CM

## 2022-12-30 DIAGNOSIS — Z78.9 LACK OF INTRAVENOUS ACCESS: Primary | ICD-10-CM

## 2022-12-30 DIAGNOSIS — N18.5 RENAL FAILURE, CHRONIC, STAGE 5 (HCC): ICD-10-CM

## 2022-12-30 DIAGNOSIS — R20.0 NUMBNESS AND TINGLING IN LEFT HAND: ICD-10-CM

## 2022-12-30 PROCEDURE — 99024 POSTOP FOLLOW-UP VISIT: CPT | Performed by: SURGERY

## 2022-12-30 PROCEDURE — 93990 DOPPLER FLOW TESTING: CPT | Performed by: STUDENT IN AN ORGANIZED HEALTH CARE EDUCATION/TRAINING PROGRAM

## 2022-12-30 NOTE — PROGRESS NOTES
Daily Progress Note   Oleksandr Hernandez MD      12/30/2022    Chief Complaint   Patient presents with    Follow-up         HISTORY OF PRESENT ILLNESS:                The patient is a 81 y.o. male who presents with a six week follow up and left arm fistula scan. Mr. Lex Bronson had a left arm ligation of the radial cephalic fistula done 55/77/4315 for a steal of the left hand. Mr. Juany Perdomo fistula has a good thrill and bruit. He says his hand is still somewhat numb. His arm has healed well, and his fistula has grown on today's duplex exam. It is not as visible as Dr. Renu Benoit was hoping for, however concern was for another steal.   Past Medical History:   Diagnosis Date    Cancer [facility])     skin of chest    Chronic atrial fibrillation [facility])     Dialysis AV fistula malfunction ([facility] [address] 10/2022    left arm    ESRD (end stage renal disease) on dialysis ([facility] [address] 01/2022    stage 5    Hemodialysis patient [facility])     Status post placement of implantable loop recorder        Past Surgical History:   Procedure Laterality Date    COLONOSCOPY      DIALYSIS FISTULA CREATION Left 8/3/2022    LEFT RADIAL ARTERY TO CEPHALIC VEIN FISTULA.  performed by Miri Acosta MD at [address] Left 10/12/2022    CREATION OF A LEFT ARM BRACHIAL ARTERY TO CEPHALIC VEIN FISTULA performed by Miri Acosta MD at [address] [facility] Right     FOR LAZY EYE    EYE SURGERY      X2-ON BLOOD VESELS IN BACK OF EYE    INSERTABLE CARDIAC MONITOR      LOOP RECORDER    VASCULAR SURGERY Left 10/12/2022    LEFT ARM LIGATION OF THE RADIAL CEPHALIC FISTULA performed by Miri Acosta MD at [address] History     Socioeconomic History    Marital status: Unknown     Spouse name: Not on file    Number of children: Not on file    Years of education: Not on file    Highest education level: Not on file   Occupational History    Not on file   Tobacco Use    Smoking status: Never    Smokeless tobacco: Never   Vaping Use Vaping Use: Never used   Substance and Sexual Activity    Alcohol use: Never    Drug use: Never    Sexual activity: Not on file   Other Topics Concern    Not on file   Social History Narrative    Not on file     Social Determinants of Health     Financial Resource Strain: Not on file   Food Insecurity: Not on file   Transportation Needs: Not on file   Physical Activity: Not on file   Stress: Not on file   Social Connections: Not on file   Intimate Partner Violence: Not on file   Housing Stability: Not on file       Family History   Problem Relation Age of Onset    No Known Problems Mother     No Known Problems Father     Dystonia Sister          Current Outpatient Medications:     omeprazole (PRILOSEC) 20 MG delayed release capsule, , Disp: , Rfl:     gabapentin (NEURONTIN) 300 MG capsule, Take 300 mg by mouth See Admin Instructions. ON Tuesday,Thursday,SATURDAYS AFTER DIALYSIS, Disp: , Rfl:     hydrOXYzine HCl (ATARAX) 25 MG tablet, Take 25 mg by mouth at bedtime, Disp: , Rfl:     Amino Acids (LIQUACEL) LIQD, Take 2 oz by mouth daily, Disp: , Rfl:     ammonium lactate (LAC-HYDRIN) 12 % lotion, Apply topically 4 times daily as needed, Disp: , Rfl:     calcium acetate (PHOSLO) 667 MG CAPS capsule, Take 667 mg by mouth in the morning and 667 mg at noon and 667 mg in the evening. Take with meals. 3 CAPSULES WITH EACH MEAL., Disp: , Rfl:     ciclopirox (LOPROX) 0.77 % cream, Apply topically as needed, Disp: , Rfl:     cinacalcet (SENSIPAR) 30 MG tablet, Take 30 mg by mouth Daily with supper, Disp: , Rfl:     clobetasol (TEMOVATE) 0.05 % ointment, Apply topically as needed, Disp: , Rfl:     vitamin D (ERGOCALCIFEROL) 1.25 MG (61879 UT) CAPS capsule, Take 50,000 Units by mouth once a week WEDNESDAY, Disp: , Rfl:     sevelamer (RENVELA) 800 MG tablet, Take 800 mg by mouth in the morning and 800 mg at noon and 800 mg in the evening. Take with meals. , Disp: , Rfl:     tamsulosin (FLOMAX) 0.4 MG capsule, Take 0.4 mg by mouth at bedtime, Disp: , Rfl:     LOKELMA 10 g PACK oral suspension, Take 10 g by mouth See Admin Instructions EVERY Monday,Wednesday,Friday,Sunday-NON DIALYSIS DAYS, Disp: , Rfl:     furosemide (LASIX) 40 MG tablet, Take 40 mg by mouth in the morning. (Patient not taking: Reported on 12/30/2022), Disp: , Rfl:     Patient has no known allergies. Vitals:    12/30/22 1044   BP: 102/60   Site: Right Upper Arm   Weight: 195 lb (88.5 kg)   Height: 6' 1\" (1.854 m)       Admission on 10/12/2022, Discharged on 10/12/2022   Component Date Value Ref Range Status    Potassium, Whole Blood 10/12/2022 4.3  3.5 - 5.1 mmol/L Final       Review of Systems    Physical Exam  Vitals and nursing note reviewed. Constitutional:       Appearance: Normal appearance. He is well-developed. Eyes:      Conjunctiva/sclera: Conjunctivae normal.      Pupils: Pupils are equal, round, and reactive to light. Cardiovascular:      Rate and Rhythm: Normal rate and regular rhythm. Pulses:           Radial pulses are 2+ on the right side and 2+ on the left side. Dorsalis pedis pulses are 2+ on the right side and 2+ on the left side. Posterior tibial pulses are 0 on the right side and 0 on the left side. Heart sounds: Normal heart sounds. Arteriovenous access: Left arteriovenous access is present.      Comments:   11/4/2022  Lt radial: biphasic  Lt ulnar: biphasic  Lt mid-palmar: monophasic      9/16/2022  Left mid-palmar: monophasic (weak)        8/19/2022  Left radial: monophasic  Left ulnar: biphasic  Left mid-palmar: monophasic      MEASUREMENTS 6/17/22:    RIGHT ANKLE: 22.0 cm   RIGHT CALF: 35.2 cm     LEFT ANKLE: 23.5 cm   LEFT CALF: 36.2 cm     Doppler 6/17/2022:  Rt DP: biphasic  Rt PT:  biphasic  Rt AT:     Lt DP: biphasic  Lt PT: biphasic  Lt AT:      6/17/22:   Lt radial: +2, biphasic  Lt ulnar: monophasic  Lt co-dominant with radial more dominant    Rt radial: +2, biphasic  Rt ulnar: monophasic  Rt co-dominant  Pulmonary:      Effort: Pulmonary effort is normal.      Breath sounds: Normal breath sounds. Abdominal:      General: Bowel sounds are normal.   Musculoskeletal:         General: Normal range of motion. Arms:       Cervical back: Normal range of motion. Neurological:      Mental Status: He is alert and oriented to person, place, and time. Psychiatric:         Speech: Speech normal.         Behavior: Behavior normal.         Thought Content: Thought content normal.         Judgment: Judgment normal.         ASSESSMENT:    Problem List Items Addressed This Visit          Medium    Lack of intravenous access - Primary    Renal failure, chronic, stage 5 (HCC)    Numbness and tingling in left hand    Steal syndrome as complication of dialysis access [facility])     He is doing well his steal pain is pretty much gone although he still has numbness that has not changed much. Duplex today demonstrated good size vein 7 511 mm up to zone 2 7.8 mm. The depth is another question 7 5 is only 2.8 mm but it gets deeper as it goes up zone 4-1/2 : 3.5 mm deep ,zone 4 :4 mm zone 3 :5 mm zone 2 :8 mm deep. There is a high velocity at the anastomosis and it only measures 2.7 mm however we purposely kept the anastomosis small as a way to treat his steal  PLAN:      We are sending a letter with you for dialysis stating they may use your fistula. If they were afraid to try it or if they try it and it does not go well we will schedule him for superficialization I explained this to the patient and his wife and ld pictures of how it is done. As always if the fistula could be used without problem then we can take his tunneled line out in the office    Return if needed.       Jyoti Zacarias MA am scribing for and in the presence of Vandana Richardson MD on this date of 12/30/22      Electronically signed by Vandana Richardson MD on 12/30/2022 at 11:16 AM

## 2023-01-18 ENCOUNTER — TELEPHONE (OUTPATIENT)
Dept: VASCULAR SURGERY | Age: 73
End: 2023-01-18

## 2023-01-18 NOTE — TELEPHONE ENCOUNTER
Chasidy Chaparro from [facility] [employer] called to say Mr. Nando Cox will not let anyone use his fistula because it is \"too small\". I called Mr. Nando Cox, as he was told at his last visit the fistula could be used, and he said he was not going to let anyone use it, that he had been told by 4 or 5 different \"other\" nurses that it was not large enough to use. I am not sure after speaking with him who the other nurses are. He says he is not happy at his dialysis center, there is too much turnover in employees, and he doesn't have to let anyone stick his arm if he does not want to, and he does not have to have any more surgery either. He wants to use only his tunnel catheter.

## (undated) DEVICE — COVER LT HNDL BLU PLAS

## (undated) DEVICE — SUTURE PROL SZ 6-0 L24IN NONABSORBABLE BLU L9.3MM BV-1 3/8 8805H

## (undated) DEVICE — GLOVE SURG SZ 8 CRM LTX FREE POLYISOPRENE POLYMER BEAD ANTI

## (undated) DEVICE — STRIP,CLOSURE,WOUND,MEDI-STRIP,1/2X4: Brand: MEDLINE

## (undated) DEVICE — NEEDLE HYPO 18GA L1.5IN THN WALL PIVOTING SHLD BVL ORIENTED

## (undated) DEVICE — AV FISTULA: Brand: MEDLINE INDUSTRIES, INC.

## (undated) DEVICE — FOGARTY - HYDRAGRIP SURGICAL - CLAMP INSERTS: Brand: FOGARTY SOFTJAW

## (undated) DEVICE — SOLUTION IV 1000ML 0.9% SOD CHL PH 5 INJ USP VIAFLX PLAS

## (undated) DEVICE — GOWN SIRUS NONREIN XL W/TWL: Brand: MEDLINE INDUSTRIES, INC.

## (undated) DEVICE — APPLIER CLP L9.375IN APER 2.1MM CLS L3.8MM 20 SM TI CLP

## (undated) DEVICE — PROVE COVER: Brand: UNBRANDED

## (undated) DEVICE — CLIP LIG SM TI 6 BLU HNDL FOR OPN AND ENDOSCP SGL APPL

## (undated) DEVICE — DISSECTOR ENDOSCP L21CM TIP CURVATURE 40DEG FN CRV JAW VES

## (undated) DEVICE — SYRINGE TB 1ML NDL 27GA L0.5IN PLAS W/ SFTY LOK PERM NDL

## (undated) DEVICE — SUTURE PROL SZ 5-0 L24IN NONABSORBABLE BLU L9.3MM BV-1 3/8 9702H

## (undated) DEVICE — 3M™ TEGADERM™ TRANSPARENT FILM DRESSING FRAME STYLE, 1626W, 4 IN X 4-3/4 IN (10 CM X 12 CM), 50/CT 4CT/CASE: Brand: 3M™ TEGADERM™

## (undated) DEVICE — LOOP VES W1.3MM THK0.9MM MINI WHT SIL FLD REPELLENT

## (undated) DEVICE — LOOP VES W25MM THK1MM MAXI RED SIL FLD REPELLENT 100 PER

## (undated) DEVICE — 3M™ STERI-STRIP™ COMPOUND BENZOIN TINCTURE 40 BAGS/CARTON 4 CARTONS/CASE C1544: Brand: 3M™ STERI-STRIP™

## (undated) DEVICE — SPONGE GZ W4XL4IN COT 12 PLY TYP VII WVN C FLD DSGN

## (undated) DEVICE — STOCKINETTE IMPERV M 9X44IN POLY INNR LAYR COT ORTH EXT

## (undated) DEVICE — SUTURE PERMAHAND SZ 2-0 L30IN NONABSORBABLE BLK SILK W/O A305H

## (undated) DEVICE — APPLIER CLP L9.38IN M LIG TI DISP STR RNG HNDL LIGACLP

## (undated) DEVICE — SUTURE BOOT: Brand: DEROYAL

## (undated) DEVICE — SYRINGE MED 3ML CLR PLAS STD N CTRL LUERLOCK TIP DISP

## (undated) DEVICE — AGENT HEMSTAT W4XL4IN OXIDIZED REGENERATED CELOS ABSRB SFT